# Patient Record
Sex: MALE | Race: WHITE | NOT HISPANIC OR LATINO
[De-identification: names, ages, dates, MRNs, and addresses within clinical notes are randomized per-mention and may not be internally consistent; named-entity substitution may affect disease eponyms.]

---

## 2017-01-13 ENCOUNTER — APPOINTMENT (OUTPATIENT)
Dept: RHEUMATOLOGY | Facility: CLINIC | Age: 77
End: 2017-01-13

## 2017-01-13 VITALS
HEART RATE: 74 BPM | DIASTOLIC BLOOD PRESSURE: 80 MMHG | HEIGHT: 66 IN | WEIGHT: 167 LBS | BODY MASS INDEX: 26.84 KG/M2 | SYSTOLIC BLOOD PRESSURE: 184 MMHG | OXYGEN SATURATION: 98 %

## 2017-01-13 DIAGNOSIS — Z82.49 FAMILY HISTORY OF ISCHEMIC HEART DISEASE AND OTHER DISEASES OF THE CIRCULATORY SYSTEM: ICD-10-CM

## 2017-01-13 DIAGNOSIS — Z86.39 PERSONAL HISTORY OF OTHER ENDOCRINE, NUTRITIONAL AND METABOLIC DISEASE: ICD-10-CM

## 2017-01-13 DIAGNOSIS — Z86.79 PERSONAL HISTORY OF OTHER DISEASES OF THE CIRCULATORY SYSTEM: ICD-10-CM

## 2017-02-03 ENCOUNTER — APPOINTMENT (OUTPATIENT)
Dept: RHEUMATOLOGY | Facility: CLINIC | Age: 77
End: 2017-02-03

## 2017-02-03 VITALS
WEIGHT: 168 LBS | BODY MASS INDEX: 27 KG/M2 | DIASTOLIC BLOOD PRESSURE: 82 MMHG | HEART RATE: 64 BPM | SYSTOLIC BLOOD PRESSURE: 159 MMHG | HEIGHT: 66 IN | TEMPERATURE: 97.9 F | OXYGEN SATURATION: 98 %

## 2017-02-03 LAB
ALBUMIN SERPL ELPH-MCNC: 4.6 G/DL
ALP BLD-CCNC: 71 U/L
ALT SERPL-CCNC: 25 U/L
ANION GAP SERPL CALC-SCNC: 16 MMOL/L
AST SERPL-CCNC: 23 U/L
BASOPHILS # BLD AUTO: 0.02 K/UL
BASOPHILS NFR BLD AUTO: 0.2 %
BILIRUB SERPL-MCNC: 0.3 MG/DL
BUN SERPL-MCNC: 13 MG/DL
CALCIUM SERPL-MCNC: 10.5 MG/DL
CHLORIDE SERPL-SCNC: 102 MMOL/L
CO2 SERPL-SCNC: 27 MMOL/L
CREAT SERPL-MCNC: 1.07 MG/DL
CRP SERPL-MCNC: 0.4 MG/DL
EOSINOPHIL # BLD AUTO: 0.05 K/UL
EOSINOPHIL NFR BLD AUTO: 0.5 %
ERYTHROCYTE [SEDIMENTATION RATE] IN BLOOD BY WESTERGREN METHOD: 27 MM/HR
GLUCOSE SERPL-MCNC: 102 MG/DL
HCT VFR BLD CALC: 45.8 %
HGB BLD-MCNC: 14.8 G/DL
IMM GRANULOCYTES NFR BLD AUTO: 0.4 %
LYMPHOCYTES # BLD AUTO: 1.13 K/UL
LYMPHOCYTES NFR BLD AUTO: 12.2 %
MAN DIFF?: NORMAL
MCHC RBC-ENTMCNC: 29.4 PG
MCHC RBC-ENTMCNC: 32.3 GM/DL
MCV RBC AUTO: 90.9 FL
MONOCYTES # BLD AUTO: 0.83 K/UL
MONOCYTES NFR BLD AUTO: 9 %
NEUTROPHILS # BLD AUTO: 7.18 K/UL
NEUTROPHILS NFR BLD AUTO: 77.7 %
PLATELET # BLD AUTO: 373 K/UL
POTASSIUM SERPL-SCNC: 4.9 MMOL/L
PROT SERPL-MCNC: 7.5 G/DL
RBC # BLD: 5.04 M/UL
RBC # FLD: 15.2 %
SODIUM SERPL-SCNC: 145 MMOL/L
WBC # FLD AUTO: 9.25 K/UL

## 2017-03-16 ENCOUNTER — RX RENEWAL (OUTPATIENT)
Age: 77
End: 2017-03-16

## 2017-04-06 ENCOUNTER — APPOINTMENT (OUTPATIENT)
Dept: RHEUMATOLOGY | Facility: CLINIC | Age: 77
End: 2017-04-06
Payer: MEDICARE

## 2017-04-06 VITALS
DIASTOLIC BLOOD PRESSURE: 91 MMHG | HEART RATE: 66 BPM | OXYGEN SATURATION: 98 % | SYSTOLIC BLOOD PRESSURE: 153 MMHG | BODY MASS INDEX: 26.15 KG/M2 | WEIGHT: 162 LBS | RESPIRATION RATE: 16 BRPM | TEMPERATURE: 98 F

## 2017-04-06 PROCEDURE — 99214 OFFICE O/P EST MOD 30 MIN: CPT

## 2017-04-06 RX ORDER — METHOTREXATE 2.5 MG/1
TABLET ORAL
Refills: 0 | Status: DISCONTINUED | COMMUNITY
End: 2017-04-06

## 2017-04-07 LAB
ALBUMIN SERPL ELPH-MCNC: 4.5 G/DL
ALP BLD-CCNC: 88 U/L
ALT SERPL-CCNC: 28 U/L
ANION GAP SERPL CALC-SCNC: 15 MMOL/L
AST SERPL-CCNC: 27 U/L
BASOPHILS # BLD AUTO: 0.03 K/UL
BASOPHILS NFR BLD AUTO: 0.6 %
BILIRUB SERPL-MCNC: 0.3 MG/DL
BUN SERPL-MCNC: 15 MG/DL
CALCIUM SERPL-MCNC: 10.2 MG/DL
CCP AB SER IA-ACNC: <8 UNITS
CHLORIDE SERPL-SCNC: 103 MMOL/L
CO2 SERPL-SCNC: 26 MMOL/L
CREAT SERPL-MCNC: 1.1 MG/DL
CRP SERPL-MCNC: 1.75 MG/DL
EOSINOPHIL # BLD AUTO: 0.16 K/UL
EOSINOPHIL NFR BLD AUTO: 3.1 %
ERYTHROCYTE [SEDIMENTATION RATE] IN BLOOD BY WESTERGREN METHOD: 32 MM/HR
GLUCOSE SERPL-MCNC: 100 MG/DL
HAV IGM SER QL: NONREACTIVE
HBV CORE IGM SER QL: NONREACTIVE
HBV SURFACE AG SER QL: NONREACTIVE
HCT VFR BLD CALC: 42.5 %
HCV AB SER QL: NONREACTIVE
HCV S/CO RATIO: 0.12 S/CO
HGB BLD-MCNC: 13.8 G/DL
IMM GRANULOCYTES NFR BLD AUTO: 0.2 %
LYMPHOCYTES # BLD AUTO: 1.04 K/UL
LYMPHOCYTES NFR BLD AUTO: 20.1 %
MAN DIFF?: NORMAL
MCHC RBC-ENTMCNC: 29 PG
MCHC RBC-ENTMCNC: 32.5 GM/DL
MCV RBC AUTO: 89.3 FL
MONOCYTES # BLD AUTO: 0.67 K/UL
MONOCYTES NFR BLD AUTO: 13 %
NEUTROPHILS # BLD AUTO: 3.26 K/UL
NEUTROPHILS NFR BLD AUTO: 63 %
PLATELET # BLD AUTO: 361 K/UL
POTASSIUM SERPL-SCNC: 4.3 MMOL/L
PROT SERPL-MCNC: 7.2 G/DL
RBC # BLD: 4.76 M/UL
RBC # FLD: 15.5 %
RF+CCP IGG SER-IMP: NEGATIVE
RHEUMATOID FACT SER QL: <7 IU/ML
SODIUM SERPL-SCNC: 144 MMOL/L
TSH SERPL-ACNC: 0.75 UIU/ML
WBC # FLD AUTO: 5.17 K/UL

## 2017-04-12 LAB
ADJUSTED MITOGEN: >10 IU/ML
ADJUSTED TB AG: 0 IU/ML
M TB IFN-G BLD-IMP: NEGATIVE
QUANTIFERON GOLD NIL: 0.03 IU/ML

## 2017-05-08 ENCOUNTER — RX RENEWAL (OUTPATIENT)
Age: 77
End: 2017-05-08

## 2017-05-18 ENCOUNTER — APPOINTMENT (OUTPATIENT)
Dept: RHEUMATOLOGY | Facility: CLINIC | Age: 77
End: 2017-05-18

## 2017-05-18 VITALS
HEIGHT: 66 IN | DIASTOLIC BLOOD PRESSURE: 88 MMHG | OXYGEN SATURATION: 97 % | HEART RATE: 68 BPM | WEIGHT: 168 LBS | SYSTOLIC BLOOD PRESSURE: 161 MMHG | BODY MASS INDEX: 27 KG/M2

## 2017-05-19 RX ORDER — ADALIMUMAB 40MG/0.8ML
40 KIT SUBCUTANEOUS
Qty: 1 | Refills: 5 | Status: DISCONTINUED | COMMUNITY
Start: 2017-04-06 | End: 2017-05-19

## 2017-05-23 ENCOUNTER — RX RENEWAL (OUTPATIENT)
Age: 77
End: 2017-05-23

## 2017-06-20 ENCOUNTER — RX RENEWAL (OUTPATIENT)
Age: 77
End: 2017-06-20

## 2017-06-21 ENCOUNTER — RX RENEWAL (OUTPATIENT)
Age: 77
End: 2017-06-21

## 2017-07-02 ENCOUNTER — RX RENEWAL (OUTPATIENT)
Age: 77
End: 2017-07-02

## 2017-07-13 ENCOUNTER — APPOINTMENT (OUTPATIENT)
Dept: RHEUMATOLOGY | Facility: CLINIC | Age: 77
End: 2017-07-13

## 2017-07-13 VITALS
TEMPERATURE: 98.1 F | HEART RATE: 70 BPM | SYSTOLIC BLOOD PRESSURE: 141 MMHG | WEIGHT: 163 LBS | OXYGEN SATURATION: 98 % | HEIGHT: 66 IN | BODY MASS INDEX: 26.2 KG/M2 | DIASTOLIC BLOOD PRESSURE: 88 MMHG

## 2017-07-13 LAB
ALBUMIN SERPL ELPH-MCNC: 4.7 G/DL
ALP BLD-CCNC: 83 U/L
ALT SERPL-CCNC: 35 U/L
ANION GAP SERPL CALC-SCNC: 17 MMOL/L
AST SERPL-CCNC: 25 U/L
BASOPHILS # BLD AUTO: 0.04 K/UL
BASOPHILS NFR BLD AUTO: 0.7 %
BILIRUB SERPL-MCNC: 0.3 MG/DL
BUN SERPL-MCNC: 18 MG/DL
CALCIUM SERPL-MCNC: 10 MG/DL
CHLORIDE SERPL-SCNC: 101 MMOL/L
CO2 SERPL-SCNC: 24 MMOL/L
CREAT SERPL-MCNC: 1.26 MG/DL
CRP SERPL-MCNC: 0.4 MG/DL
EOSINOPHIL # BLD AUTO: 0.24 K/UL
EOSINOPHIL NFR BLD AUTO: 4.2 %
ERYTHROCYTE [SEDIMENTATION RATE] IN BLOOD BY WESTERGREN METHOD: 27 MM/HR
GLUCOSE SERPL-MCNC: 82 MG/DL
HCT VFR BLD CALC: 43 %
HGB BLD-MCNC: 13.9 G/DL
IMM GRANULOCYTES NFR BLD AUTO: 0.4 %
LYMPHOCYTES # BLD AUTO: 1.24 K/UL
LYMPHOCYTES NFR BLD AUTO: 21.9 %
MAN DIFF?: NORMAL
MCHC RBC-ENTMCNC: 29 PG
MCHC RBC-ENTMCNC: 32.3 GM/DL
MCV RBC AUTO: 89.6 FL
MONOCYTES # BLD AUTO: 0.87 K/UL
MONOCYTES NFR BLD AUTO: 15.4 %
NEUTROPHILS # BLD AUTO: 3.25 K/UL
NEUTROPHILS NFR BLD AUTO: 57.4 %
PLATELET # BLD AUTO: 276 K/UL
POTASSIUM SERPL-SCNC: 4.7 MMOL/L
PROT SERPL-MCNC: 7.4 G/DL
RBC # BLD: 4.8 M/UL
RBC # FLD: 14.6 %
SODIUM SERPL-SCNC: 142 MMOL/L
WBC # FLD AUTO: 5.66 K/UL

## 2017-07-13 RX ORDER — PREDNISONE 5 MG/1
5 TABLET ORAL
Qty: 50 | Refills: 1 | Status: DISCONTINUED | COMMUNITY
Start: 2017-01-13 | End: 2017-07-13

## 2017-07-13 RX ORDER — GOLIMUMAB 50 MG/4ML
50 SOLUTION INTRAVENOUS
Qty: 4 | Refills: 0 | Status: COMPLETED | OUTPATIENT
Start: 2017-05-19 | End: 2017-07-13

## 2017-07-13 RX ORDER — PREDNISONE 10 MG
TABLET ORAL
Refills: 0 | Status: COMPLETED | COMMUNITY
End: 2017-07-13

## 2017-08-24 ENCOUNTER — APPOINTMENT (OUTPATIENT)
Dept: RHEUMATOLOGY | Facility: CLINIC | Age: 77
End: 2017-08-24

## 2017-08-30 RX ORDER — SULFASALAZINE 500 MG/1
500 TABLET, DELAYED RELEASE ORAL
Qty: 60 | Refills: 1 | Status: COMPLETED | COMMUNITY
Start: 2017-05-23 | End: 2017-08-30

## 2017-08-30 RX ORDER — HYDROXYCHLOROQUINE SULFATE 200 MG/1
200 TABLET, FILM COATED ORAL TWICE DAILY
Qty: 60 | Refills: 0 | Status: COMPLETED | COMMUNITY
Start: 2017-05-23 | End: 2017-08-30

## 2017-09-14 ENCOUNTER — RX RENEWAL (OUTPATIENT)
Age: 77
End: 2017-09-14

## 2017-09-15 ENCOUNTER — APPOINTMENT (OUTPATIENT)
Dept: RHEUMATOLOGY | Facility: CLINIC | Age: 77
End: 2017-09-15

## 2017-10-06 ENCOUNTER — APPOINTMENT (OUTPATIENT)
Dept: RHEUMATOLOGY | Facility: CLINIC | Age: 77
End: 2017-10-06
Payer: MEDICARE

## 2017-10-06 VITALS
DIASTOLIC BLOOD PRESSURE: 72 MMHG | SYSTOLIC BLOOD PRESSURE: 149 MMHG | HEIGHT: 66 IN | TEMPERATURE: 98 F | BODY MASS INDEX: 25.55 KG/M2 | WEIGHT: 159 LBS | OXYGEN SATURATION: 96 % | HEART RATE: 60 BPM

## 2017-10-06 DIAGNOSIS — Z87.19 PERSONAL HISTORY OF OTHER DISEASES OF THE DIGESTIVE SYSTEM: ICD-10-CM

## 2017-10-06 PROCEDURE — 99214 OFFICE O/P EST MOD 30 MIN: CPT

## 2017-11-14 ENCOUNTER — RX RENEWAL (OUTPATIENT)
Age: 77
End: 2017-11-14

## 2017-12-08 ENCOUNTER — APPOINTMENT (OUTPATIENT)
Dept: RHEUMATOLOGY | Facility: CLINIC | Age: 77
End: 2017-12-08
Payer: MEDICARE

## 2017-12-08 VITALS
OXYGEN SATURATION: 97 % | SYSTOLIC BLOOD PRESSURE: 150 MMHG | DIASTOLIC BLOOD PRESSURE: 82 MMHG | TEMPERATURE: 98.2 F | HEART RATE: 70 BPM | HEIGHT: 66 IN | RESPIRATION RATE: 16 BRPM | BODY MASS INDEX: 25.55 KG/M2 | WEIGHT: 159 LBS

## 2017-12-08 PROCEDURE — 99214 OFFICE O/P EST MOD 30 MIN: CPT

## 2017-12-21 ENCOUNTER — RX RENEWAL (OUTPATIENT)
Age: 77
End: 2017-12-21

## 2017-12-21 RX ORDER — TOFACITINIB 5 MG/1
5 TABLET, FILM COATED ORAL
Qty: 60 | Refills: 1 | Status: DISCONTINUED | COMMUNITY
Start: 2017-05-08 | End: 2017-12-21

## 2018-01-30 ENCOUNTER — APPOINTMENT (OUTPATIENT)
Dept: GASTROENTEROLOGY | Facility: CLINIC | Age: 78
End: 2018-01-30

## 2018-03-09 ENCOUNTER — APPOINTMENT (OUTPATIENT)
Dept: RHEUMATOLOGY | Facility: CLINIC | Age: 78
End: 2018-03-09

## 2018-04-12 ENCOUNTER — APPOINTMENT (OUTPATIENT)
Dept: GASTROENTEROLOGY | Facility: CLINIC | Age: 78
End: 2018-04-12
Payer: MEDICARE

## 2018-04-12 VITALS
TEMPERATURE: 97.8 F | DIASTOLIC BLOOD PRESSURE: 80 MMHG | WEIGHT: 169 LBS | HEIGHT: 70 IN | SYSTOLIC BLOOD PRESSURE: 120 MMHG | BODY MASS INDEX: 24.2 KG/M2

## 2018-04-12 DIAGNOSIS — Z82.49 FAMILY HISTORY OF ISCHEMIC HEART DISEASE AND OTHER DISEASES OF THE CIRCULATORY SYSTEM: ICD-10-CM

## 2018-04-12 DIAGNOSIS — Z78.9 OTHER SPECIFIED HEALTH STATUS: ICD-10-CM

## 2018-04-12 DIAGNOSIS — M06.00 RHEUMATOID ARTHRITIS W/OUT RHEUMATOID FACTOR, UNSPECIFIED SITE: ICD-10-CM

## 2018-04-12 DIAGNOSIS — Z83.1 FAMILY HISTORY OF OTHER INFECTIOUS AND PARASITIC DISEASES: ICD-10-CM

## 2018-04-12 DIAGNOSIS — K63.5 POLYP OF COLON: ICD-10-CM

## 2018-04-12 PROCEDURE — 99204 OFFICE O/P NEW MOD 45 MIN: CPT

## 2018-04-12 RX ORDER — METHOTREXATE 2.5 MG/1
2.5 TABLET ORAL
Qty: 32 | Refills: 2 | Status: DISCONTINUED | COMMUNITY
Start: 2017-02-03 | End: 2018-04-12

## 2018-04-12 RX ORDER — LACTOBACILLUS ACIDOPHILUS/PECT 30 MG-20MG
TABLET ORAL
Refills: 0 | Status: DISCONTINUED | COMMUNITY
End: 2018-04-12

## 2018-04-12 RX ORDER — TOFACITINIB 11 MG/1
11 TABLET, FILM COATED, EXTENDED RELEASE ORAL
Qty: 90 | Refills: 1 | Status: DISCONTINUED | COMMUNITY
Start: 2017-12-08 | End: 2018-04-12

## 2018-04-12 RX ORDER — CHROMIUM 200 MCG
TABLET ORAL
Refills: 0 | Status: DISCONTINUED | COMMUNITY
End: 2018-04-12

## 2018-04-12 RX ORDER — OMEPRAZOLE 20 MG/1
20 CAPSULE, DELAYED RELEASE ORAL
Qty: 30 | Refills: 5 | Status: DISCONTINUED | COMMUNITY
Start: 2017-01-13 | End: 2018-04-12

## 2018-04-12 RX ORDER — LEVOTHYROXINE SODIUM 175 MCG
175 TABLET ORAL
Refills: 0 | Status: DISCONTINUED | COMMUNITY
End: 2018-04-12

## 2018-05-07 ENCOUNTER — APPOINTMENT (OUTPATIENT)
Dept: GASTROENTEROLOGY | Facility: CLINIC | Age: 78
End: 2018-05-07
Payer: MEDICARE

## 2018-05-07 VITALS
SYSTOLIC BLOOD PRESSURE: 130 MMHG | BODY MASS INDEX: 23.34 KG/M2 | HEIGHT: 70 IN | TEMPERATURE: 97.5 F | WEIGHT: 163 LBS | DIASTOLIC BLOOD PRESSURE: 80 MMHG

## 2018-05-07 DIAGNOSIS — Z09 ENCOUNTER FOR FOLLOW-UP EXAMINATION AFTER COMPLETED TREATMENT FOR CONDITIONS OTHER THAN MALIGNANT NEOPLASM: ICD-10-CM

## 2018-05-07 DIAGNOSIS — R74.8 ABNORMAL LEVELS OF OTHER SERUM ENZYMES: ICD-10-CM

## 2018-05-07 DIAGNOSIS — K86.89 OTHER SPECIFIED DISEASES OF PANCREAS: ICD-10-CM

## 2018-05-07 PROCEDURE — 99213 OFFICE O/P EST LOW 20 MIN: CPT

## 2018-06-28 ENCOUNTER — APPOINTMENT (OUTPATIENT)
Dept: VASCULAR SURGERY | Facility: CLINIC | Age: 78
End: 2018-06-28
Payer: MEDICARE

## 2018-06-28 PROCEDURE — 99203 OFFICE O/P NEW LOW 30 MIN: CPT

## 2018-07-03 VITALS — SYSTOLIC BLOOD PRESSURE: 125 MMHG | DIASTOLIC BLOOD PRESSURE: 70 MMHG

## 2018-07-03 RX ORDER — CHOLECALCIFEROL (VITAMIN D3) 1250 MCG
1.25 MG CAPSULE ORAL
Qty: 4 | Refills: 0 | Status: COMPLETED | COMMUNITY
Start: 2017-01-13 | End: 2018-07-03

## 2018-07-03 RX ORDER — OMEPRAZOLE MAGNESIUM 20 MG/1
20 TABLET, DELAYED RELEASE ORAL DAILY
Qty: 30 | Refills: 0 | Status: COMPLETED | OUTPATIENT
Start: 2018-04-12 | End: 2018-07-03

## 2019-09-02 PROBLEM — Z09 FOLLOW UP: Status: ACTIVE | Noted: 2018-05-07

## 2024-02-14 ENCOUNTER — NON-APPOINTMENT (OUTPATIENT)
Age: 84
End: 2024-02-14

## 2024-02-14 ENCOUNTER — LABORATORY RESULT (OUTPATIENT)
Age: 84
End: 2024-02-14

## 2024-02-14 ENCOUNTER — APPOINTMENT (OUTPATIENT)
Dept: FAMILY MEDICINE | Facility: CLINIC | Age: 84
End: 2024-02-14
Payer: MEDICARE

## 2024-02-14 VITALS
DIASTOLIC BLOOD PRESSURE: 84 MMHG | TEMPERATURE: 96.5 F | HEART RATE: 74 BPM | BODY MASS INDEX: 23.48 KG/M2 | RESPIRATION RATE: 16 BRPM | WEIGHT: 164 LBS | SYSTOLIC BLOOD PRESSURE: 139 MMHG | HEIGHT: 70 IN | OXYGEN SATURATION: 94 %

## 2024-02-14 DIAGNOSIS — E03.9 HYPOTHYROIDISM, UNSPECIFIED: ICD-10-CM

## 2024-02-14 DIAGNOSIS — M79.89 OTHER SPECIFIED SOFT TISSUE DISORDERS: ICD-10-CM

## 2024-02-14 DIAGNOSIS — H91.90 UNSPECIFIED HEARING LOSS, UNSPECIFIED EAR: ICD-10-CM

## 2024-02-14 DIAGNOSIS — Z86.61 PERSONAL HISTORY OF INFECTIONS OF THE CENTRAL NERVOUS SYSTEM: ICD-10-CM

## 2024-02-14 DIAGNOSIS — M06.9 RHEUMATOID ARTHRITIS, UNSPECIFIED: ICD-10-CM

## 2024-02-14 DIAGNOSIS — Z84.0 FAMILY HISTORY OF DISEASES OF THE SKIN AND SUBCUTANEOUS TISSUE: ICD-10-CM

## 2024-02-14 DIAGNOSIS — Z86.79 PERSONAL HISTORY OF OTHER DISEASES OF THE CIRCULATORY SYSTEM: ICD-10-CM

## 2024-02-14 DIAGNOSIS — Z80.0 FAMILY HISTORY OF MALIGNANT NEOPLASM OF DIGESTIVE ORGANS: ICD-10-CM

## 2024-02-14 DIAGNOSIS — Z82.0 FAMILY HISTORY OF EPILEPSY AND OTHER DISEASES OF THE NERVOUS SYSTEM: ICD-10-CM

## 2024-02-14 DIAGNOSIS — I73.9 PERIPHERAL VASCULAR DISEASE, UNSPECIFIED: ICD-10-CM

## 2024-02-14 DIAGNOSIS — Z87.898 PERSONAL HISTORY OF OTHER SPECIFIED CONDITIONS: ICD-10-CM

## 2024-02-14 DIAGNOSIS — K57.30 DIVERTICULOSIS OF LARGE INTESTINE W/OUT PERFORATION OR ABSCESS W/OUT BLEEDING: ICD-10-CM

## 2024-02-14 DIAGNOSIS — Z23 ENCOUNTER FOR IMMUNIZATION: ICD-10-CM

## 2024-02-14 DIAGNOSIS — Z00.00 ENCOUNTER FOR GENERAL ADULT MEDICAL EXAMINATION W/OUT ABNORMAL FINDINGS: ICD-10-CM

## 2024-02-14 DIAGNOSIS — M51.26 OTHER INTERVERTEBRAL DISC DISPLACEMENT, LUMBAR REGION: ICD-10-CM

## 2024-02-14 DIAGNOSIS — J06.9 ACUTE UPPER RESPIRATORY INFECTION, UNSPECIFIED: ICD-10-CM

## 2024-02-14 DIAGNOSIS — K29.70 GASTRITIS, UNSPECIFIED, W/OUT BLEEDING: ICD-10-CM

## 2024-02-14 DIAGNOSIS — Z79.899 OTHER LONG TERM (CURRENT) DRUG THERAPY: ICD-10-CM

## 2024-02-14 DIAGNOSIS — Z87.19 PERSONAL HISTORY OF OTHER DISEASES OF THE DIGESTIVE SYSTEM: ICD-10-CM

## 2024-02-14 DIAGNOSIS — R10.9 UNSPECIFIED ABDOMINAL PAIN: ICD-10-CM

## 2024-02-14 DIAGNOSIS — R35.0 FREQUENCY OF MICTURITION: ICD-10-CM

## 2024-02-14 DIAGNOSIS — R94.31 ABNORMAL ELECTROCARDIOGRAM [ECG] [EKG]: ICD-10-CM

## 2024-02-14 DIAGNOSIS — L30.9 DERMATITIS, UNSPECIFIED: ICD-10-CM

## 2024-02-14 PROCEDURE — G0439: CPT

## 2024-02-14 PROCEDURE — 93000 ELECTROCARDIOGRAM COMPLETE: CPT

## 2024-02-14 PROCEDURE — 36415 COLL VENOUS BLD VENIPUNCTURE: CPT

## 2024-02-14 RX ORDER — FOLIC ACID 1 MG/1
1 TABLET ORAL
Refills: 0 | Status: DISCONTINUED | COMMUNITY
End: 2024-02-14

## 2024-02-14 RX ORDER — VITAMIN E ACID SUCCINATE 268 MG
TABLET ORAL
Refills: 0 | Status: ACTIVE | COMMUNITY

## 2024-02-14 RX ORDER — ASPIRIN 81 MG
81 TABLET, DELAYED RELEASE (ENTERIC COATED) ORAL
Refills: 0 | Status: DISCONTINUED | COMMUNITY
End: 2024-02-14

## 2024-02-14 RX ORDER — MULTIVIT-MIN/FOLIC/VIT K/LYCOP 400-300MCG
25 MCG TABLET ORAL
Refills: 0 | Status: DISCONTINUED | COMMUNITY
End: 2024-02-14

## 2024-02-14 RX ORDER — PSYLLIUM HUSK 0.4 G
CAPSULE ORAL
Refills: 0 | Status: ACTIVE | COMMUNITY

## 2024-02-14 RX ORDER — ASCORBIC ACID 500 MG
TABLET ORAL
Refills: 0 | Status: ACTIVE | COMMUNITY

## 2024-02-14 RX ORDER — NAPROXEN SODIUM 220 MG
220 TABLET ORAL
Refills: 0 | Status: DISCONTINUED | COMMUNITY
End: 2024-02-14

## 2024-02-14 NOTE — PHYSICAL EXAM
[No Acute Distress] : no acute distress [Well Nourished] : well nourished [Well Developed] : well developed [Well-Appearing] : well-appearing [Normal Sclera/Conjunctiva] : normal sclera/conjunctiva [PERRL] : pupils equal round and reactive to light [EOMI] : extraocular movements intact [Normal Outer Ear/Nose] : the outer ears and nose were normal in appearance [Normal Oropharynx] : the oropharynx was normal [No JVD] : no jugular venous distention [No Lymphadenopathy] : no lymphadenopathy [Supple] : supple [Thyroid Normal, No Nodules] : the thyroid was normal and there were no nodules present [No Respiratory Distress] : no respiratory distress  [No Accessory Muscle Use] : no accessory muscle use [Clear to Auscultation] : lungs were clear to auscultation bilaterally [Normal Rate] : normal rate  [Regular Rhythm] : with a regular rhythm [Normal S1, S2] : normal S1 and S2 [No Murmur] : no murmur heard [No Abdominal Bruit] : a ~M bruit was not heard ~T in the abdomen [No Varicosities] : no varicosities [Pedal Pulses Present] : the pedal pulses are present [No Edema] : there was no peripheral edema [No Palpable Aorta] : no palpable aorta [No Extremity Clubbing/Cyanosis] : no extremity clubbing/cyanosis [Soft] : abdomen soft [Non Tender] : non-tender [Non-distended] : non-distended [No Masses] : no abdominal mass palpated [No HSM] : no HSM [Normal Bowel Sounds] : normal bowel sounds [Normal Posterior Cervical Nodes] : no posterior cervical lymphadenopathy [Normal Anterior Cervical Nodes] : no anterior cervical lymphadenopathy [No CVA Tenderness] : no CVA  tenderness [No Spinal Tenderness] : no spinal tenderness [No Joint Swelling] : no joint swelling [Grossly Normal Strength/Tone] : grossly normal strength/tone [No Rash] : no rash [Coordination Grossly Intact] : coordination grossly intact [No Focal Deficits] : no focal deficits [Normal Gait] : normal gait [Normal Affect] : the affect was normal [Normal Insight/Judgement] : insight and judgment were intact [Normal TMs] : both tympanic membranes were normal [Normal Supraclavicular Nodes] : no supraclavicular lymphadenopathy [Alert and Oriented x3] : oriented to person, place, and time [de-identified] : elderly male [de-identified] : dentures [de-identified] : diminished lung sounds; productive cough

## 2024-02-14 NOTE — HISTORY OF PRESENT ILLNESS
[Spouse] : spouse [FreeTextEntry1] : 83 year old male who presents today for a complete physical exam and seeking new PCP wife of current pt Dian Palafox. c/o persistent cough for a 1 1/2 wk c/o chronic abdominal pain relieved after BM states had recent nml colonoscopy in nov 2023 c/o urinary frequency [de-identified] : flu vx 10/23 pneumonia vx 10/23 tdap 11 yrs ago

## 2024-02-14 NOTE — HEALTH RISK ASSESSMENT
[Good] : ~his/her~  mood as  good [Yes] : Yes [Monthly or less (1 pt)] : Monthly or less (1 point) [1 or 2 (0 pts)] : 1 or 2 (0 points) [Never (0 pts)] : Never (0 points) [No] : In the past 12 months have you used drugs other than those required for medical reasons? No [Former] : Former [20 or more] : 20 or more [de-identified] : walks everywhere [de-identified] : quit 20 yrs ago; 1 ppd x 50 yrs

## 2024-02-15 LAB
25(OH)D3 SERPL-MCNC: 50.2 NG/ML
APPEARANCE: CLEAR
BASOPHILS # BLD AUTO: 0.04 K/UL
BASOPHILS NFR BLD AUTO: 0.5 %
BILIRUBIN URINE: NEGATIVE
BLOOD URINE: ABNORMAL
CHOLEST SERPL-MCNC: 170 MG/DL
COLOR: YELLOW
EOSINOPHIL # BLD AUTO: 0.26 K/UL
EOSINOPHIL NFR BLD AUTO: 3.4 %
ESTIMATED AVERAGE GLUCOSE: 117 MG/DL
GLUCOSE QUALITATIVE U: NEGATIVE MG/DL
HBA1C MFR BLD HPLC: 5.7 %
HCT VFR BLD CALC: 50.8 %
HDLC SERPL-MCNC: 46 MG/DL
HGB BLD-MCNC: 16.3 G/DL
IMM GRANULOCYTES NFR BLD AUTO: 0.4 %
KETONES URINE: NEGATIVE MG/DL
LDLC SERPL CALC-MCNC: 101 MG/DL
LEUKOCYTE ESTERASE URINE: ABNORMAL
LYMPHOCYTES # BLD AUTO: 1.44 K/UL
LYMPHOCYTES NFR BLD AUTO: 18.8 %
MAN DIFF?: NORMAL
MCHC RBC-ENTMCNC: 28.8 PG
MCHC RBC-ENTMCNC: 32.1 GM/DL
MCV RBC AUTO: 89.9 FL
MONOCYTES # BLD AUTO: 0.93 K/UL
MONOCYTES NFR BLD AUTO: 12.2 %
NEUTROPHILS # BLD AUTO: 4.95 K/UL
NEUTROPHILS NFR BLD AUTO: 64.7 %
NITRITE URINE: NEGATIVE
NONHDLC SERPL-MCNC: 124 MG/DL
PH URINE: 6.5
PLATELET # BLD AUTO: 370 K/UL
PROTEIN URINE: NORMAL MG/DL
PSA SERPL-MCNC: 3.16 NG/ML
RBC # BLD: 5.65 M/UL
RBC # FLD: 14.1 %
SPECIFIC GRAVITY URINE: 1.02
T3FREE SERPL-MCNC: 2.72 PG/ML
T4 FREE SERPL-MCNC: 1.8 NG/DL
TESTOST SERPL-MCNC: 435 NG/DL
TRIGL SERPL-MCNC: 128 MG/DL
TSH SERPL-ACNC: 0.94 UIU/ML
UROBILINOGEN URINE: 0.2 MG/DL
WBC # FLD AUTO: 7.65 K/UL

## 2024-02-16 PROBLEM — M51.26 LUMBAR HERNIATED DISC: Status: ACTIVE | Noted: 2024-02-16

## 2024-02-16 PROBLEM — Z86.79 HISTORY OF INTERMITTENT CLAUDICATION: Status: RESOLVED | Noted: 2024-02-16 | Resolved: 2024-02-16

## 2024-02-16 PROBLEM — Z86.79 HISTORY OF CORONARY ARTERY DISEASE: Status: RESOLVED | Noted: 2024-02-16 | Resolved: 2024-02-16

## 2024-02-16 PROBLEM — I73.9 PERIPHERAL VASCULAR DISEASE: Status: ACTIVE | Noted: 2024-02-16

## 2024-02-16 PROBLEM — M06.9 RHEUMATOID ARTHRITIS: Status: ACTIVE | Noted: 2024-02-16

## 2024-02-16 PROBLEM — H91.90 HARD OF HEARING: Status: ACTIVE | Noted: 2024-02-14

## 2024-02-16 PROBLEM — K57.30 DIVERTICULOSIS OF COLON: Status: RESOLVED | Noted: 2024-02-16 | Resolved: 2024-02-16

## 2024-02-16 PROBLEM — Z86.61 HISTORY OF ENCEPHALITIS: Status: RESOLVED | Noted: 2024-02-16 | Resolved: 2024-02-16

## 2024-02-16 PROBLEM — L30.9 ECZEMA: Status: ACTIVE | Noted: 2024-02-14

## 2024-02-16 LAB
ALBUMIN SERPL ELPH-MCNC: 5.2 G/DL
ALP BLD-CCNC: 134 U/L
ALT SERPL-CCNC: 31 U/L
ANION GAP SERPL CALC-SCNC: 14 MMOL/L
AST SERPL-CCNC: 29 U/L
BILIRUB SERPL-MCNC: 0.6 MG/DL
BUN SERPL-MCNC: 16 MG/DL
CALCIUM SERPL-MCNC: 10.3 MG/DL
CHLORIDE SERPL-SCNC: 101 MMOL/L
CO2 SERPL-SCNC: 29 MMOL/L
CREAT SERPL-MCNC: 1.2 MG/DL
EGFR: 60 ML/MIN/1.73M2
GLUCOSE SERPL-MCNC: 100 MG/DL
POTASSIUM SERPL-SCNC: 4.6 MMOL/L
PROT SERPL-MCNC: 7.8 G/DL
SODIUM SERPL-SCNC: 144 MMOL/L

## 2024-02-21 DIAGNOSIS — J20.9 ACUTE BRONCHITIS, UNSPECIFIED: ICD-10-CM

## 2024-02-28 RX ORDER — LEVOTHYROXINE SODIUM 0.1 MG/1
100 TABLET ORAL
Qty: 90 | Refills: 1 | Status: ACTIVE | COMMUNITY
Start: 2018-04-03 | End: 1900-01-01

## 2024-02-28 RX ORDER — AMLODIPINE BESYLATE 10 MG/1
10 TABLET ORAL
Qty: 90 | Refills: 1 | Status: ACTIVE | COMMUNITY
Start: 1900-01-01 | End: 1900-01-01

## 2024-03-27 ENCOUNTER — OUTPATIENT (OUTPATIENT)
Dept: OUTPATIENT SERVICES | Facility: HOSPITAL | Age: 84
LOS: 1 days | End: 2024-03-27
Payer: MEDICARE

## 2024-03-27 ENCOUNTER — APPOINTMENT (OUTPATIENT)
Dept: RADIOLOGY | Facility: CLINIC | Age: 84
End: 2024-03-27
Payer: MEDICARE

## 2024-03-27 ENCOUNTER — APPOINTMENT (OUTPATIENT)
Dept: FAMILY MEDICINE | Facility: CLINIC | Age: 84
End: 2024-03-27
Payer: MEDICARE

## 2024-03-27 VITALS
OXYGEN SATURATION: 95 % | SYSTOLIC BLOOD PRESSURE: 130 MMHG | HEART RATE: 104 BPM | TEMPERATURE: 97.5 F | HEIGHT: 70 IN | RESPIRATION RATE: 16 BRPM | BODY MASS INDEX: 23.19 KG/M2 | WEIGHT: 162 LBS | DIASTOLIC BLOOD PRESSURE: 70 MMHG

## 2024-03-27 DIAGNOSIS — R06.02 SHORTNESS OF BREATH: ICD-10-CM

## 2024-03-27 DIAGNOSIS — R05.3 CHRONIC COUGH: ICD-10-CM

## 2024-03-27 DIAGNOSIS — Z87.891 PERSONAL HISTORY OF NICOTINE DEPENDENCE: ICD-10-CM

## 2024-03-27 DIAGNOSIS — J18.9 PNEUMONIA, UNSPECIFIED ORGANISM: ICD-10-CM

## 2024-03-27 PROCEDURE — 71046 X-RAY EXAM CHEST 2 VIEWS: CPT

## 2024-03-27 PROCEDURE — 71046 X-RAY EXAM CHEST 2 VIEWS: CPT | Mod: 26

## 2024-03-27 PROCEDURE — 99214 OFFICE O/P EST MOD 30 MIN: CPT

## 2024-03-27 NOTE — HISTORY OF PRESENT ILLNESS
[FreeTextEntry1] : c/o persistent cough and phlegm; former smoker; went to urgent care completed augmentin, prednisone, albuterol, still on mucinex and promethazine but feels he's not getting better

## 2024-03-27 NOTE — PHYSICAL EXAM
[No Acute Distress] : no acute distress [Normal Sclera/Conjunctiva] : normal sclera/conjunctiva [EOMI] : extraocular movements intact [Normal Outer Ear/Nose] : the outer ears and nose were normal in appearance [No JVD] : no jugular venous distention [Coordination Grossly Intact] : coordination grossly intact [Normal Rate] : normal rate  [Regular Rhythm] : with a regular rhythm [Alert and Oriented x3] : oriented to person, place, and time [Normal Affect] : the affect was normal [de-identified] : diminished to b/l lobes, occasional wheeze [Normal Insight/Judgement] : insight and judgment were intact

## 2024-03-29 RX ORDER — ALBUTEROL SULFATE 90 UG/1
108 (90 BASE) INHALANT RESPIRATORY (INHALATION)
Qty: 1 | Refills: 1 | Status: ACTIVE | COMMUNITY
Start: 2024-03-29 | End: 1900-01-01

## 2024-04-10 ENCOUNTER — APPOINTMENT (OUTPATIENT)
Dept: FAMILY MEDICINE | Facility: CLINIC | Age: 84
End: 2024-04-10
Payer: MEDICARE

## 2024-04-10 VITALS
TEMPERATURE: 97.5 F | DIASTOLIC BLOOD PRESSURE: 74 MMHG | SYSTOLIC BLOOD PRESSURE: 138 MMHG | WEIGHT: 162 LBS | OXYGEN SATURATION: 95 % | BODY MASS INDEX: 23.19 KG/M2 | RESPIRATION RATE: 16 BRPM | HEART RATE: 82 BPM | HEIGHT: 70 IN

## 2024-04-10 DIAGNOSIS — R09.89 OTHER SPECIFIED SYMPTOMS AND SIGNS INVOLVING THE CIRCULATORY AND RESPIRATORY SYSTEMS: ICD-10-CM

## 2024-04-10 DIAGNOSIS — R05.3 CHRONIC COUGH: ICD-10-CM

## 2024-04-10 DIAGNOSIS — J06.9 ACUTE UPPER RESPIRATORY INFECTION, UNSPECIFIED: ICD-10-CM

## 2024-04-10 PROCEDURE — 99213 OFFICE O/P EST LOW 20 MIN: CPT

## 2024-04-10 RX ORDER — DOXYCYCLINE 100 MG/1
100 CAPSULE ORAL TWICE DAILY
Qty: 20 | Refills: 0 | Status: DISCONTINUED | COMMUNITY
Start: 2024-03-27 | End: 2024-04-10

## 2024-04-10 RX ORDER — PROMETHAZINE HYDROCHLORIDE AND DEXTROMETHORPHAN HYDROBROMIDE ORAL SOLUTION 15; 6.25 MG/5ML; MG/5ML
6.25-15 SOLUTION ORAL
Qty: 1 | Refills: 1 | Status: DISCONTINUED | COMMUNITY
Start: 2024-02-21 | End: 2024-04-10

## 2024-04-10 RX ORDER — AZITHROMYCIN 250 MG/1
250 TABLET, FILM COATED ORAL
Qty: 1 | Refills: 0 | Status: DISCONTINUED | COMMUNITY
Start: 2024-02-14 | End: 2024-04-10

## 2024-04-10 RX ORDER — BENZONATATE 200 MG/1
200 CAPSULE ORAL
Qty: 30 | Refills: 0 | Status: DISCONTINUED | COMMUNITY
Start: 2024-03-29 | End: 2024-04-10

## 2024-04-10 NOTE — HISTORY OF PRESENT ILLNESS
[FreeTextEntry1] : here for follow up; feeling a lot better from recent URI, completed meds planning to travel to [place] in June for at least a month

## 2024-05-30 ENCOUNTER — APPOINTMENT (OUTPATIENT)
Dept: CARDIOLOGY | Facility: CLINIC | Age: 84
End: 2024-05-30
Payer: MEDICARE

## 2024-05-30 VITALS — SYSTOLIC BLOOD PRESSURE: 140 MMHG | DIASTOLIC BLOOD PRESSURE: 62 MMHG

## 2024-05-30 VITALS
OXYGEN SATURATION: 95 % | WEIGHT: 156 LBS | DIASTOLIC BLOOD PRESSURE: 71 MMHG | SYSTOLIC BLOOD PRESSURE: 147 MMHG | BODY MASS INDEX: 22.38 KG/M2 | HEART RATE: 65 BPM

## 2024-05-30 DIAGNOSIS — I10 ESSENTIAL (PRIMARY) HYPERTENSION: ICD-10-CM

## 2024-05-30 DIAGNOSIS — R06.02 SHORTNESS OF BREATH: ICD-10-CM

## 2024-05-30 DIAGNOSIS — E78.49 OTHER HYPERLIPIDEMIA: ICD-10-CM

## 2024-05-30 DIAGNOSIS — I45.10 UNSPECIFIED RIGHT BUNDLE-BRANCH BLOCK: ICD-10-CM

## 2024-05-30 PROCEDURE — G2211 COMPLEX E/M VISIT ADD ON: CPT

## 2024-05-30 PROCEDURE — 99204 OFFICE O/P NEW MOD 45 MIN: CPT

## 2024-05-30 NOTE — HISTORY OF PRESENT ILLNESS
[FreeTextEntry1] : 83M presents for eval of RODAS, RBBB  Pt noted on ECG with RBBB (no prior ECG's) Also notes some dyspnea when working the lawn while on his knees, also dyspnea with walking Notes symptoms have been present for around 1 year. Denies CP, lightheadedness "Aortic aneurysm" listed on problem list on EMR, pt is unaware   Former smoker, quit 2004. [place]ired- [employer] . Accompanied by his wife  ECG: SR with RBBB  Atorvastatin 10mg Amlodipine 10mg

## 2024-05-30 NOTE — DISCUSSION/SUMMARY
[FreeTextEntry1] : RODAS: Pt notes for the last year. Known atherosclerosis in abdominal aorta in past.   TTE Pharm NST to rule out ischemia  HTN: BP borderline, improving on recheck. Continue amlodipine  HLD: , on 10mg liptior. May need more aggressive care  AAA: 3.1cm on scan from 2018. Recommend vascular eval, number provided   RV 6M

## 2024-06-05 ENCOUNTER — APPOINTMENT (OUTPATIENT)
Dept: CARDIOLOGY | Facility: CLINIC | Age: 84
End: 2024-06-05
Payer: MEDICARE

## 2024-06-05 PROCEDURE — A9500: CPT

## 2024-06-05 PROCEDURE — 93015 CV STRESS TEST SUPVJ I&R: CPT

## 2024-06-05 PROCEDURE — 78452 HT MUSCLE IMAGE SPECT MULT: CPT

## 2024-06-10 ENCOUNTER — NON-APPOINTMENT (OUTPATIENT)
Age: 84
End: 2024-06-10

## 2024-06-10 RX ORDER — ATORVASTATIN CALCIUM 10 MG/1
10 TABLET, FILM COATED ORAL
Qty: 90 | Refills: 0 | Status: ACTIVE | COMMUNITY
Start: 1900-01-01 | End: 1900-01-01

## 2024-06-24 ENCOUNTER — APPOINTMENT (OUTPATIENT)
Dept: VASCULAR SURGERY | Facility: CLINIC | Age: 84
End: 2024-06-24
Payer: MEDICARE

## 2024-06-24 VITALS
HEIGHT: 70 IN | OXYGEN SATURATION: 95 % | DIASTOLIC BLOOD PRESSURE: 78 MMHG | BODY MASS INDEX: 22.76 KG/M2 | SYSTOLIC BLOOD PRESSURE: 149 MMHG | WEIGHT: 159 LBS | HEART RATE: 78 BPM

## 2024-06-24 DIAGNOSIS — I71.9 AORTIC ANEURYSM OF UNSPECIFIED SITE, W/OUT RUPTURE: ICD-10-CM

## 2024-06-24 PROCEDURE — 99203 OFFICE O/P NEW LOW 30 MIN: CPT

## 2024-06-24 PROCEDURE — 93978 VASCULAR STUDY: CPT

## 2024-06-24 RX ORDER — OMEPRAZOLE 40 MG/1
40 CAPSULE, DELAYED RELEASE ORAL
Refills: 0 | Status: ACTIVE | COMMUNITY

## 2024-06-27 PROBLEM — I71.9 AORTIC ANEURYSM: Status: ACTIVE | Noted: 2018-05-07

## 2024-07-02 ENCOUNTER — APPOINTMENT (OUTPATIENT)
Dept: CT IMAGING | Facility: CLINIC | Age: 84
End: 2024-07-02
Payer: MEDICARE

## 2024-07-02 ENCOUNTER — OUTPATIENT (OUTPATIENT)
Dept: OUTPATIENT SERVICES | Facility: HOSPITAL | Age: 84
LOS: 1 days | End: 2024-07-02
Payer: MEDICARE

## 2024-07-02 DIAGNOSIS — I71.9 AORTIC ANEURYSM OF UNSPECIFIED SITE, WITHOUT RUPTURE: ICD-10-CM

## 2024-07-02 DIAGNOSIS — Z87.39 PERSONAL HISTORY OF OTHER DISEASES OF THE MUSCULOSKELETAL SYSTEM AND CONNECTIVE TISSUE: Chronic | ICD-10-CM

## 2024-07-02 PROCEDURE — 74174 CTA ABD&PLVS W/CONTRAST: CPT

## 2024-07-02 PROCEDURE — 74174 CTA ABD&PLVS W/CONTRAST: CPT | Mod: 26

## 2024-07-10 ENCOUNTER — APPOINTMENT (OUTPATIENT)
Dept: VASCULAR SURGERY | Facility: CLINIC | Age: 84
End: 2024-07-10
Payer: MEDICARE

## 2024-07-10 VITALS
OXYGEN SATURATION: 95 % | TEMPERATURE: 97.6 F | BODY MASS INDEX: 22.76 KG/M2 | HEART RATE: 76 BPM | HEIGHT: 70 IN | WEIGHT: 159 LBS | SYSTOLIC BLOOD PRESSURE: 130 MMHG | DIASTOLIC BLOOD PRESSURE: 80 MMHG

## 2024-07-10 DIAGNOSIS — I71.43 INFRARENAL ABDOMINAL AORTIC ANEURYSM, WITHOUT RUPTURE: ICD-10-CM

## 2024-07-10 PROCEDURE — 99213 OFFICE O/P EST LOW 20 MIN: CPT

## 2024-07-15 ENCOUNTER — APPOINTMENT (OUTPATIENT)
Dept: INTERNAL MEDICINE | Facility: CLINIC | Age: 84
End: 2024-07-15
Payer: MEDICARE

## 2024-07-15 PROCEDURE — 93306 TTE W/DOPPLER COMPLETE: CPT

## 2024-07-30 ENCOUNTER — APPOINTMENT (OUTPATIENT)
Dept: INTERNAL MEDICINE | Facility: CLINIC | Age: 84
End: 2024-07-30
Payer: MEDICARE

## 2024-07-30 VITALS
OXYGEN SATURATION: 96 % | SYSTOLIC BLOOD PRESSURE: 136 MMHG | WEIGHT: 157 LBS | HEIGHT: 70 IN | BODY MASS INDEX: 22.48 KG/M2 | DIASTOLIC BLOOD PRESSURE: 68 MMHG | HEART RATE: 71 BPM

## 2024-07-30 DIAGNOSIS — I10 ESSENTIAL (PRIMARY) HYPERTENSION: ICD-10-CM

## 2024-07-30 DIAGNOSIS — R10.9 UNSPECIFIED ABDOMINAL PAIN: ICD-10-CM

## 2024-07-30 DIAGNOSIS — K29.70 GASTRITIS, UNSPECIFIED, W/OUT BLEEDING: ICD-10-CM

## 2024-07-30 DIAGNOSIS — E03.9 HYPOTHYROIDISM, UNSPECIFIED: ICD-10-CM

## 2024-07-30 PROCEDURE — 99203 OFFICE O/P NEW LOW 30 MIN: CPT

## 2024-07-30 NOTE — PHYSICAL EXAM

## 2024-07-30 NOTE — HISTORY OF PRESENT ILLNESS
[FreeTextEntry1] : Mid- epigastric pain going on for a few years . Had many tests that were unrevealing. He also c/o constipation. He uses laxatives at night, pain occurs at night when he is lying down H/o Aortic aneurysm. he is a former smoker, has HTN and high cholesterol. he ahs a h/o Heartburn and is omeprazole.  he had colonoscopy 11/1 2023 and it was normal. No h/o ETOH abuse  patient says during the day he is fine.  when he goes to bed discomfort starts grade 8/10 .

## 2024-08-13 ENCOUNTER — APPOINTMENT (OUTPATIENT)
Dept: FAMILY MEDICINE | Facility: CLINIC | Age: 84
End: 2024-08-13
Payer: MEDICARE

## 2024-08-13 VITALS
WEIGHT: 157 LBS | SYSTOLIC BLOOD PRESSURE: 120 MMHG | TEMPERATURE: 98 F | DIASTOLIC BLOOD PRESSURE: 78 MMHG | HEART RATE: 69 BPM | RESPIRATION RATE: 16 BRPM | BODY MASS INDEX: 22.48 KG/M2 | HEIGHT: 70 IN | OXYGEN SATURATION: 96 %

## 2024-08-13 DIAGNOSIS — R45.89 OTHER SYMPTOMS AND SIGNS INVOLVING EMOTIONAL STATE: ICD-10-CM

## 2024-08-13 DIAGNOSIS — F43.23 ADJUSTMENT DISORDER WITH MIXED ANXIETY AND DEPRESSED MOOD: ICD-10-CM

## 2024-08-13 DIAGNOSIS — E78.49 OTHER HYPERLIPIDEMIA: ICD-10-CM

## 2024-08-13 DIAGNOSIS — E03.9 HYPOTHYROIDISM, UNSPECIFIED: ICD-10-CM

## 2024-08-13 DIAGNOSIS — I10 ESSENTIAL (PRIMARY) HYPERTENSION: ICD-10-CM

## 2024-08-13 PROCEDURE — 99214 OFFICE O/P EST MOD 30 MIN: CPT

## 2024-08-13 PROCEDURE — 36415 COLL VENOUS BLD VENIPUNCTURE: CPT

## 2024-08-13 RX ORDER — ESCITALOPRAM OXALATE 10 MG/1
10 TABLET ORAL DAILY
Qty: 90 | Refills: 1 | Status: ACTIVE | COMMUNITY
Start: 2024-08-13 | End: 1900-01-01

## 2024-08-13 NOTE — HISTORY OF PRESENT ILLNESS
[Spouse] : spouse [FreeTextEntry1] : 83 yo male here for follow up wife states pt has suffered from depression x 40 yrs, but does not want help; pt admits to mother not being there when he was younger wife states she was single mom who worked a lot and  "harbors things from the past" has tried lexapro in the past x 2 wks, then stopped

## 2024-08-13 NOTE — PLAN
[FreeTextEntry1] : restart lexapro allowed to vent, emotional support provided refused BH referral despite explaining benefits chk bw rtc in 2 mths

## 2024-08-16 LAB
ALBUMIN SERPL ELPH-MCNC: 4.7 G/DL
ALP BLD-CCNC: 99 U/L
ALT SERPL-CCNC: 27 U/L
ANION GAP SERPL CALC-SCNC: 14 MMOL/L
AST SERPL-CCNC: 29 U/L
BASOPHILS # BLD AUTO: 0.04 K/UL
BASOPHILS NFR BLD AUTO: 0.6 %
BILIRUB SERPL-MCNC: 0.3 MG/DL
BUN SERPL-MCNC: 18 MG/DL
CALCIUM SERPL-MCNC: 9.7 MG/DL
CHLORIDE SERPL-SCNC: 102 MMOL/L
CHOLEST SERPL-MCNC: 128 MG/DL
CO2 SERPL-SCNC: 28 MMOL/L
CREAT SERPL-MCNC: 1.08 MG/DL
EGFR: 68 ML/MIN/1.73M2
EOSINOPHIL # BLD AUTO: 0.28 K/UL
EOSINOPHIL NFR BLD AUTO: 3.9 %
GLUCOSE SERPL-MCNC: 109 MG/DL
HCT VFR BLD CALC: 47.2 %
HDLC SERPL-MCNC: 42 MG/DL
HGB BLD-MCNC: 14.7 G/DL
IMM GRANULOCYTES NFR BLD AUTO: 0.4 %
LDLC SERPL CALC-MCNC: 68 MG/DL
LYMPHOCYTES # BLD AUTO: 1.11 K/UL
LYMPHOCYTES NFR BLD AUTO: 15.3 %
MAN DIFF?: NORMAL
MCHC RBC-ENTMCNC: 28.8 PG
MCHC RBC-ENTMCNC: 31.1 GM/DL
MCV RBC AUTO: 92.5 FL
MONOCYTES # BLD AUTO: 0.81 K/UL
MONOCYTES NFR BLD AUTO: 11.2 %
NEUTROPHILS # BLD AUTO: 4.98 K/UL
NEUTROPHILS NFR BLD AUTO: 68.6 %
NONHDLC SERPL-MCNC: 86 MG/DL
PLATELET # BLD AUTO: 299 K/UL
POTASSIUM SERPL-SCNC: 4.7 MMOL/L
PROT SERPL-MCNC: 7.3 G/DL
RBC # BLD: 5.1 M/UL
RBC # FLD: 14.2 %
SODIUM SERPL-SCNC: 144 MMOL/L
T3FREE SERPL-MCNC: 2.93 PG/ML
T4 FREE SERPL-MCNC: 2 NG/DL
TRIGL SERPL-MCNC: 100 MG/DL
TSH SERPL-ACNC: 0.76 UIU/ML
WBC # FLD AUTO: 7.25 K/UL

## 2024-08-26 ENCOUNTER — RX RENEWAL (OUTPATIENT)
Age: 84
End: 2024-08-26

## 2024-09-09 ENCOUNTER — APPOINTMENT (OUTPATIENT)
Dept: UROLOGY | Facility: CLINIC | Age: 84
End: 2024-09-09
Payer: MEDICARE

## 2024-09-09 ENCOUNTER — NON-APPOINTMENT (OUTPATIENT)
Age: 84
End: 2024-09-09

## 2024-09-09 VITALS
WEIGHT: 155 LBS | HEIGHT: 70 IN | DIASTOLIC BLOOD PRESSURE: 76 MMHG | RESPIRATION RATE: 16 BRPM | TEMPERATURE: 97.5 F | BODY MASS INDEX: 22.19 KG/M2 | OXYGEN SATURATION: 95 % | HEART RATE: 80 BPM | SYSTOLIC BLOOD PRESSURE: 145 MMHG

## 2024-09-09 DIAGNOSIS — I10 ESSENTIAL (PRIMARY) HYPERTENSION: ICD-10-CM

## 2024-09-09 DIAGNOSIS — N40.0 BENIGN PROSTATIC HYPERPLASIA WITHOUT LOWER URINARY TRACT SYMPMS: ICD-10-CM

## 2024-09-09 PROCEDURE — 99203 OFFICE O/P NEW LOW 30 MIN: CPT

## 2024-09-09 RX ORDER — ESCITALOPRAM OXALATE 5 MG/1
TABLET, FILM COATED ORAL
Refills: 0 | Status: ACTIVE | COMMUNITY

## 2024-09-09 RX ORDER — TAMSULOSIN HYDROCHLORIDE 0.4 MG/1
0.4 CAPSULE ORAL
Qty: 180 | Refills: 0 | Status: ACTIVE | COMMUNITY
Start: 2024-09-09 | End: 1900-01-01

## 2024-09-09 RX ORDER — AMLODIPINE BESYLATE 5 MG/1
TABLET ORAL
Refills: 0 | Status: ACTIVE | COMMUNITY

## 2024-09-09 RX ORDER — DUTASTERIDE 0.5 MG/1
0.5 CAPSULE, LIQUID FILLED ORAL
Qty: 180 | Refills: 0 | Status: ACTIVE | COMMUNITY
Start: 2024-09-09 | End: 1900-01-01

## 2024-09-09 RX ORDER — SENNOSIDES 8.6 MG
TABLET ORAL
Refills: 0 | Status: ACTIVE | COMMUNITY

## 2024-09-09 NOTE — REVIEW OF SYSTEMS
[Shortness Of Breath] : shortness of breath [Abdominal Pain] : abdominal pain [Constipation] : constipation [Wake up at night to urinate  How many times?  ___] : wakes up to urinate [unfilled] times during the night [Wait a long time to urinate] : waits a long time to urinate [Slow urine stream] : slow urine stream [Interrupted urine stream] : interrupted urine stream [Itching] : itching [Difficulty Walking] : difficulty walking [Depression] : depression [Easy Bleeding] : a tendency for easy bleeding [Easy Bruising] : a tendency for easy bruising [Negative] : Endocrine

## 2024-09-09 NOTE — ASSESSMENT
[FreeTextEntry1] : 85 y/o male here today for weak urinary stream and frequent urination. Nocturia X4/5. Denies hematuria, dysuria, flank pain, or any other urinary issues COCO  Occasionally eating chocolate. Adequately hydrating   FHx of PCA: denies  Tobacco use: former smoker  Last PSA: 3.16 ng/mL (02/15/24)  Last creatinine: 1.08 mg/dL (08/15/24)  Last UCx: negative (02/14/2024)  Uro Meds: None   Creatinine Hx:  1.08 mg/dL (08/15/24)  1.20 mg/dL (02/14/24)  1.26 mg/dL (07/13/17)   Kidney percussion test negative bilaterally, no pain reported on bi-manual palpation bilaterally, no pain on superficial and deep palpation on the iliac fossa bilaterally and on the ureteral points   07/02/2024 - CT scan prostate vol - 53.1cc  09/09/2024 - PVR >400mL  Emptying the bladder with straight cath.  Patient to start Dutasteride + Tamsulosin. Explained to the patient that dutasteride could cause loss of sexual desire and could be correlated to a decreased sensitivity of exams to prostate cancer (eg.: PSA). Tamsulosin could cause hypotension and has to be taken before bed. Will F/U in 2 weeks for new PVR to evaluate treatment plan.

## 2024-09-09 NOTE — PHYSICAL EXAM
[Normal Appearance] : normal appearance [Well Groomed] : well groomed [General Appearance - In No Acute Distress] : no acute distress [Edema] : no peripheral edema [Respiration, Rhythm And Depth] : normal respiratory rhythm and effort [Exaggerated Use Of Accessory Muscles For Inspiration] : no accessory muscle use [Abdomen Soft] : soft [Abdomen Tenderness] : non-tender [Costovertebral Angle Tenderness] : no ~M costovertebral angle tenderness [Urinary Bladder Findings] : the bladder was normal on palpation [Normal Station and Gait] : the gait and station were normal for the patient's age [] : no rash [No Focal Deficits] : no focal deficits [Oriented To Time, Place, And Person] : oriented to person, place, and time [Affect] : the affect was normal [Mood] : the mood was normal [No Palpable Adenopathy] : no palpable adenopathy [de-identified] :  Kidney percussion test negative bilaterally, no pain reported on bi-manual palpation bilaterally, no pain on superficial and deep palpation on the iliac fossa bilaterally and on the ureteral points

## 2024-09-09 NOTE — HISTORY OF PRESENT ILLNESS
[FreeTextEntry1] : 85 y/o male here today for   The patient states   Denies nocturia, hematuria, dysuria, flank pain, or any other urinary issues COCO  Denies eating spicy, citrus, chocolate. Adequately hydrating   FHx of PCA: denies  Tobacco use: former smoker  Last PSA: 3.16 ng/mL (02/15/24)  Last creatinine: 1.08 mg/dL (08/15/24)  Last UCx: negative (02/14/2024)  Uro Meds: None   Creatinine Hx:  1.08 mg/dL (08/15/24)  1.20 mg/dL (02/14/24)  1.26 mg/dL (07/13/17)

## 2024-09-10 LAB
APPEARANCE: CLEAR
BILIRUBIN URINE: NEGATIVE
BLOOD URINE: NEGATIVE
COLOR: YELLOW
GLUCOSE QUALITATIVE U: NEGATIVE MG/DL
KETONES URINE: NEGATIVE MG/DL
LEUKOCYTE ESTERASE URINE: NEGATIVE
NITRITE URINE: NEGATIVE
PH URINE: 6.5
PROTEIN URINE: NEGATIVE MG/DL
SPECIFIC GRAVITY URINE: 1.02
UROBILINOGEN URINE: 0.2 MG/DL

## 2024-09-23 ENCOUNTER — APPOINTMENT (OUTPATIENT)
Dept: INTERNAL MEDICINE | Facility: AMBULATORY MEDICAL SERVICES | Age: 84
End: 2024-09-23
Payer: MEDICARE

## 2024-09-23 DIAGNOSIS — K29.70 GASTRITIS, UNSPECIFIED, W/OUT BLEEDING: ICD-10-CM

## 2024-09-23 PROCEDURE — 43239 EGD BIOPSY SINGLE/MULTIPLE: CPT

## 2024-09-23 RX ORDER — PANTOPRAZOLE 40 MG/1
40 TABLET, DELAYED RELEASE ORAL DAILY
Qty: 90 | Refills: 3 | Status: ACTIVE | COMMUNITY
Start: 2024-09-23 | End: 1900-01-01

## 2024-09-30 ENCOUNTER — APPOINTMENT (OUTPATIENT)
Dept: UROLOGY | Facility: CLINIC | Age: 84
End: 2024-09-30
Payer: MEDICARE

## 2024-09-30 VITALS
HEIGHT: 70 IN | RESPIRATION RATE: 16 BRPM | WEIGHT: 158.13 LBS | OXYGEN SATURATION: 93 % | SYSTOLIC BLOOD PRESSURE: 131 MMHG | HEART RATE: 82 BPM | DIASTOLIC BLOOD PRESSURE: 74 MMHG | BODY MASS INDEX: 22.64 KG/M2 | TEMPERATURE: 97.6 F

## 2024-09-30 DIAGNOSIS — N40.0 BENIGN PROSTATIC HYPERPLASIA WITHOUT LOWER URINARY TRACT SYMPMS: ICD-10-CM

## 2024-09-30 PROCEDURE — 51798 US URINE CAPACITY MEASURE: CPT

## 2024-09-30 PROCEDURE — 99213 OFFICE O/P EST LOW 20 MIN: CPT

## 2024-09-30 NOTE — ASSESSMENT
[FreeTextEntry1] : 85 y/o male here today for repeat PVR. Pt had weak urinary stream and frequent urination. Nocturia X4/5. Denies hematuria, dysuria, flank pain, or any other urinary issues COCO Occasionally eating chocolate. Adequately hydrating  FHx of PCA: denies Tobacco use: former smoker Last PSA: 3.16 ng/mL (02/15/24) Last creatinine: 1.08 mg/dL (08/15/24) Last UCx: negative (02/14/2024) Uro Meds: Dutasteride 0.5 mg and Tamsulosin 0.4 mg  Creatinine Hx: 1.08 mg/dL (08/15/24) 1.20 mg/dL (02/14/24) 1.26 mg/dL (07/13/17)  07/02/2024 - CT scan prostate vol - 53.1cc  09/09/2024 - PVR >400mL Emptying the bladder with straight cath.  09/09/2024 - Kidney percussion test negative bilaterally, no pain reported on bi-manual palpation bilaterally, no pain on superficial and deep palpation on the iliac fossa bilaterally and on the ureteral points Patient to start Dutasteride + Tamsulosin. Explained to the patient that dutasteride could cause loss of sexual desire and could be correlated to a decreased sensitivity of exams to prostate cancer (eg.: PSA). Tamsulosin could cause hypotension and has to be taken before bed. Will F/U in 2 weeks for new PVR to evaluate treatment plan.  09/30/2024 - PVR  163mL  Patient to continue with Dutasteride + Tamsulosin. Explained again to the patient that dutasteride could cause loss of sexual desire and could be correlated to a decreased sensitivity of exams to prostate cancer (eg.: PSA). Tamsulosin could cause hypotension and has to be taken before bed. Will F/U in 6 months for uroflow + US prostate to check for any improvement.

## 2024-09-30 NOTE — ASSESSMENT
[FreeTextEntry1] : 83 y/o male here today for repeat PVR. Pt had weak urinary stream and frequent urination. Nocturia X4/5. Denies hematuria, dysuria, flank pain, or any other urinary issues COCO Occasionally eating chocolate. Adequately hydrating  FHx of PCA: denies Tobacco use: former smoker Last PSA: 3.16 ng/mL (02/15/24) Last creatinine: 1.08 mg/dL (08/15/24) Last UCx: negative (02/14/2024) Uro Meds: Dutasteride 0.5 mg and Tamsulosin 0.4 mg  Creatinine Hx: 1.08 mg/dL (08/15/24) 1.20 mg/dL (02/14/24) 1.26 mg/dL (07/13/17)  07/02/2024 - CT scan prostate vol - 53.1cc  09/09/2024 - PVR >400mL Emptying the bladder with straight cath.  09/09/2024 - Kidney percussion test negative bilaterally, no pain reported on bi-manual palpation bilaterally, no pain on superficial and deep palpation on the iliac fossa bilaterally and on the ureteral points Patient to start Dutasteride + Tamsulosin. Explained to the patient that dutasteride could cause loss of sexual desire and could be correlated to a decreased sensitivity of exams to prostate cancer (eg.: PSA). Tamsulosin could cause hypotension and has to be taken before bed. Will F/U in 2 weeks for new PVR to evaluate treatment plan.  09/30/2024 - PVR  163mL  Patient to continue with Dutasteride + Tamsulosin. Explained again to the patient that dutasteride could cause loss of sexual desire and could be correlated to a decreased sensitivity of exams to prostate cancer (eg.: PSA). Tamsulosin could cause hypotension and has to be taken before bed. Will F/U in 6 months for uroflow + US prostate to check for any improvement.

## 2024-09-30 NOTE — HISTORY OF PRESENT ILLNESS
[FreeTextEntry1] : 83 y/o male here today for repeat PVR. Pt had weak urinary stream and frequent urination. Nocturia X4/5. Denies hematuria, dysuria, flank pain, or any other urinary issues COCO Occasionally eating chocolate. Adequately hydrating  FHx of PCA: denies Tobacco use: former smoker Last PSA: 3.16 ng/mL (02/15/24) Last creatinine: 1.08 mg/dL (08/15/24) Last UCx: negative (02/14/2024) Uro Meds: Dutasteride 0.5 mg and Tamsulosin 0.4 mg  Creatinine Hx: 1.08 mg/dL (08/15/24) 1.20 mg/dL (02/14/24) 1.26 mg/dL (07/13/17)  07/02/2024 - CT scan prostate vol - 53.1cc  09/09/2024 - PVR >400mL Emptying the bladder with straight cath.  09/09/2024 - Kidney percussion test negative bilaterally, no pain reported on bi-manual palpation bilaterally, no pain on superficial and deep palpation on the iliac fossa bilaterally and on the ureteral points

## 2024-10-22 ENCOUNTER — APPOINTMENT (OUTPATIENT)
Dept: FAMILY MEDICINE | Facility: CLINIC | Age: 84
End: 2024-10-22
Payer: MEDICARE

## 2024-10-22 VITALS
WEIGHT: 154.13 LBS | BODY MASS INDEX: 22.06 KG/M2 | OXYGEN SATURATION: 93 % | RESPIRATION RATE: 16 BRPM | HEIGHT: 70 IN | HEART RATE: 92 BPM | DIASTOLIC BLOOD PRESSURE: 67 MMHG | SYSTOLIC BLOOD PRESSURE: 119 MMHG

## 2024-10-22 DIAGNOSIS — K59.09 OTHER CONSTIPATION: ICD-10-CM

## 2024-10-22 DIAGNOSIS — R45.89 OTHER SYMPTOMS AND SIGNS INVOLVING EMOTIONAL STATE: ICD-10-CM

## 2024-10-22 DIAGNOSIS — Z71.89 OTHER SPECIFIED COUNSELING: ICD-10-CM

## 2024-10-22 DIAGNOSIS — J30.9 ALLERGIC RHINITIS, UNSPECIFIED: ICD-10-CM

## 2024-10-22 DIAGNOSIS — F43.23 ADJUSTMENT DISORDER WITH MIXED ANXIETY AND DEPRESSED MOOD: ICD-10-CM

## 2024-10-22 PROCEDURE — 99214 OFFICE O/P EST MOD 30 MIN: CPT

## 2024-10-22 RX ORDER — BUPROPION HYDROCHLORIDE 150 MG/1
150 TABLET, EXTENDED RELEASE ORAL DAILY
Qty: 60 | Refills: 1 | Status: ACTIVE | COMMUNITY
Start: 2024-10-22 | End: 1900-01-01

## 2024-10-22 RX ORDER — FLUTICASONE PROPIONATE 50 UG/1
50 SPRAY, METERED NASAL TWICE DAILY
Qty: 1 | Refills: 2 | Status: ACTIVE | COMMUNITY
Start: 2024-10-22 | End: 1900-01-01

## 2024-10-22 RX ORDER — LORATADINE 10 MG/1
10 TABLET ORAL
Qty: 30 | Refills: 3 | Status: ACTIVE | COMMUNITY
Start: 2024-10-22 | End: 1900-01-01

## 2025-02-07 ENCOUNTER — RX RENEWAL (OUTPATIENT)
Age: 85
End: 2025-02-07

## 2025-02-21 ENCOUNTER — RX RENEWAL (OUTPATIENT)
Age: 85
End: 2025-02-21

## 2025-03-01 ENCOUNTER — APPOINTMENT (OUTPATIENT)
Dept: FAMILY MEDICINE | Facility: CLINIC | Age: 85
End: 2025-03-01
Payer: MEDICARE

## 2025-03-01 ENCOUNTER — LABORATORY RESULT (OUTPATIENT)
Age: 85
End: 2025-03-01

## 2025-03-01 VITALS
HEIGHT: 70 IN | WEIGHT: 164 LBS | BODY MASS INDEX: 23.48 KG/M2 | RESPIRATION RATE: 16 BRPM | DIASTOLIC BLOOD PRESSURE: 80 MMHG | TEMPERATURE: 97.4 F | SYSTOLIC BLOOD PRESSURE: 140 MMHG | OXYGEN SATURATION: 96 % | HEART RATE: 73 BPM

## 2025-03-01 DIAGNOSIS — Z00.00 ENCOUNTER FOR GENERAL ADULT MEDICAL EXAMINATION W/OUT ABNORMAL FINDINGS: ICD-10-CM

## 2025-03-01 DIAGNOSIS — J30.9 ALLERGIC RHINITIS, UNSPECIFIED: ICD-10-CM

## 2025-03-01 DIAGNOSIS — E78.49 OTHER HYPERLIPIDEMIA: ICD-10-CM

## 2025-03-01 DIAGNOSIS — E03.9 HYPOTHYROIDISM, UNSPECIFIED: ICD-10-CM

## 2025-03-01 DIAGNOSIS — N40.0 BENIGN PROSTATIC HYPERPLASIA WITHOUT LOWER URINARY TRACT SYMPMS: ICD-10-CM

## 2025-03-01 DIAGNOSIS — R06.02 SHORTNESS OF BREATH: ICD-10-CM

## 2025-03-01 DIAGNOSIS — L98.9 DISORDER OF THE SKIN AND SUBCUTANEOUS TISSUE, UNSPECIFIED: ICD-10-CM

## 2025-03-01 DIAGNOSIS — I10 ESSENTIAL (PRIMARY) HYPERTENSION: ICD-10-CM

## 2025-03-01 DIAGNOSIS — I71.9 AORTIC ANEURYSM OF UNSPECIFIED SITE, W/OUT RUPTURE: ICD-10-CM

## 2025-03-01 DIAGNOSIS — H91.90 UNSPECIFIED HEARING LOSS, UNSPECIFIED EAR: ICD-10-CM

## 2025-03-01 DIAGNOSIS — R74.8 ABNORMAL LEVELS OF OTHER SERUM ENZYMES: ICD-10-CM

## 2025-03-01 PROCEDURE — G0439: CPT

## 2025-03-01 PROCEDURE — 36415 COLL VENOUS BLD VENIPUNCTURE: CPT

## 2025-03-01 PROCEDURE — 99213 OFFICE O/P EST LOW 20 MIN: CPT | Mod: 25

## 2025-03-01 RX ORDER — AZELASTINE HYDROCHLORIDE 137 UG/1
0.1 SPRAY, METERED NASAL
Qty: 1 | Refills: 1 | Status: ACTIVE | COMMUNITY
Start: 2025-03-01 | End: 1900-01-01

## 2025-03-03 DIAGNOSIS — E87.5 HYPERKALEMIA: ICD-10-CM

## 2025-03-03 LAB
ALBUMIN SERPL ELPH-MCNC: 4.8 G/DL
ALP BLD-CCNC: 117 U/L
ALT SERPL-CCNC: 24 U/L
AMYLASE/CREAT SERPL: 115 U/L
ANION GAP SERPL CALC-SCNC: 16 MMOL/L
APPEARANCE: CLEAR
AST SERPL-CCNC: 25 U/L
BASOPHILS # BLD AUTO: 0.04 K/UL
BASOPHILS NFR BLD AUTO: 0.5 %
BILIRUB SERPL-MCNC: 0.4 MG/DL
BILIRUBIN URINE: NEGATIVE
BLOOD URINE: ABNORMAL
BUN SERPL-MCNC: 12 MG/DL
CALCIUM SERPL-MCNC: 9.8 MG/DL
CHLORIDE SERPL-SCNC: 99 MMOL/L
CHOLEST SERPL-MCNC: 157 MG/DL
CO2 SERPL-SCNC: 26 MMOL/L
COLOR: YELLOW
CREAT SERPL-MCNC: 1.05 MG/DL
EGFR: 70 ML/MIN/1.73M2
EOSINOPHIL # BLD AUTO: 0.18 K/UL
EOSINOPHIL NFR BLD AUTO: 2.1 %
ESTIMATED AVERAGE GLUCOSE: 120 MG/DL
GLUCOSE QUALITATIVE U: NEGATIVE MG/DL
GLUCOSE SERPL-MCNC: 73 MG/DL
HBA1C MFR BLD HPLC: 5.8 %
HCT VFR BLD CALC: 48.5 %
HDLC SERPL-MCNC: 45 MG/DL
HGB BLD-MCNC: 15.6 G/DL
IMM GRANULOCYTES NFR BLD AUTO: 0.5 %
KETONES URINE: NEGATIVE MG/DL
LDLC SERPL CALC-MCNC: 80 MG/DL
LEUKOCYTE ESTERASE URINE: ABNORMAL
LPL SERPL-CCNC: 20 U/L
LYMPHOCYTES # BLD AUTO: 0.9 K/UL
LYMPHOCYTES NFR BLD AUTO: 10.3 %
MAN DIFF?: NORMAL
MCHC RBC-ENTMCNC: 28.8 PG
MCHC RBC-ENTMCNC: 32.2 G/DL
MCV RBC AUTO: 89.6 FL
MONOCYTES # BLD AUTO: 0.96 K/UL
MONOCYTES NFR BLD AUTO: 10.9 %
NEUTROPHILS # BLD AUTO: 6.65 K/UL
NEUTROPHILS NFR BLD AUTO: 75.7 %
NITRITE URINE: NEGATIVE
NONHDLC SERPL-MCNC: 112 MG/DL
PH URINE: 6.5
PLATELET # BLD AUTO: 368 K/UL
POTASSIUM SERPL-SCNC: 5.7 MMOL/L
PROT SERPL-MCNC: 7.5 G/DL
PROTEIN URINE: NORMAL MG/DL
PSA SERPL-MCNC: 1.92 NG/ML
RBC # BLD: 5.41 M/UL
RBC # FLD: 14.2 %
SODIUM SERPL-SCNC: 141 MMOL/L
SPECIFIC GRAVITY URINE: 1.01
T3FREE SERPL-MCNC: 2.29 PG/ML
T4 FREE SERPL-MCNC: 1.5 NG/DL
TRIGL SERPL-MCNC: 189 MG/DL
TSH SERPL-ACNC: 3.97 UIU/ML
UROBILINOGEN URINE: 0.2 MG/DL
WBC # FLD AUTO: 8.77 K/UL

## 2025-03-07 ENCOUNTER — APPOINTMENT (OUTPATIENT)
Dept: CT IMAGING | Facility: CLINIC | Age: 85
End: 2025-03-07

## 2025-03-07 ENCOUNTER — OUTPATIENT (OUTPATIENT)
Dept: OUTPATIENT SERVICES | Facility: HOSPITAL | Age: 85
LOS: 1 days | End: 2025-03-07
Payer: MEDICARE

## 2025-03-07 DIAGNOSIS — Z87.39 PERSONAL HISTORY OF OTHER DISEASES OF THE MUSCULOSKELETAL SYSTEM AND CONNECTIVE TISSUE: Chronic | ICD-10-CM

## 2025-03-07 DIAGNOSIS — Z00.00 ENCOUNTER FOR GENERAL ADULT MEDICAL EXAMINATION WITHOUT ABNORMAL FINDINGS: ICD-10-CM

## 2025-03-07 DIAGNOSIS — Z00.8 ENCOUNTER FOR OTHER GENERAL EXAMINATION: ICD-10-CM

## 2025-03-07 PROCEDURE — 71250 CT THORAX DX C-: CPT | Mod: 26

## 2025-03-07 PROCEDURE — 71250 CT THORAX DX C-: CPT

## 2025-03-12 ENCOUNTER — RX RENEWAL (OUTPATIENT)
Age: 85
End: 2025-03-12

## 2025-03-18 ENCOUNTER — APPOINTMENT (OUTPATIENT)
Dept: DERMATOLOGY | Facility: CLINIC | Age: 85
End: 2025-03-18
Payer: MEDICARE

## 2025-03-18 DIAGNOSIS — L28.0 LICHEN SIMPLEX CHRONICUS: ICD-10-CM

## 2025-03-18 DIAGNOSIS — L57.0 ACTINIC KERATOSIS: ICD-10-CM

## 2025-03-18 PROCEDURE — 17000 DESTRUCT PREMALG LESION: CPT

## 2025-03-18 PROCEDURE — 99204 OFFICE O/P NEW MOD 45 MIN: CPT | Mod: 25

## 2025-03-18 RX ORDER — BETAMETHASONE DIPROPIONATE 0.5 MG/G
0.05 OINTMENT, AUGMENTED TOPICAL
Qty: 1 | Refills: 2 | Status: ACTIVE | COMMUNITY
Start: 2025-03-18 | End: 1900-01-01

## 2025-03-19 PROBLEM — L57.0 ACTINIC KERATOSIS: Status: ACTIVE | Noted: 2025-03-19

## 2025-04-01 ENCOUNTER — APPOINTMENT (OUTPATIENT)
Dept: UROLOGY | Facility: CLINIC | Age: 85
End: 2025-04-01
Payer: MEDICARE

## 2025-04-01 ENCOUNTER — APPOINTMENT (OUTPATIENT)
Dept: UROLOGY | Facility: CLINIC | Age: 85
End: 2025-04-01

## 2025-04-01 VITALS
WEIGHT: 157 LBS | DIASTOLIC BLOOD PRESSURE: 73 MMHG | HEART RATE: 81 BPM | RESPIRATION RATE: 16 BRPM | HEIGHT: 70 IN | BODY MASS INDEX: 22.48 KG/M2 | SYSTOLIC BLOOD PRESSURE: 137 MMHG | OXYGEN SATURATION: 94 %

## 2025-04-01 DIAGNOSIS — N40.0 BENIGN PROSTATIC HYPERPLASIA WITHOUT LOWER URINARY TRACT SYMPMS: ICD-10-CM

## 2025-04-01 PROCEDURE — 99213 OFFICE O/P EST LOW 20 MIN: CPT

## 2025-04-01 PROCEDURE — 76872 US TRANSRECTAL: CPT

## 2025-04-01 RX ORDER — DUTASTERIDE 0.5 MG/1
0.5 CAPSULE, LIQUID FILLED ORAL
Qty: 180 | Refills: 0 | Status: ACTIVE | COMMUNITY
Start: 2025-04-01 | End: 1900-01-01

## 2025-04-01 RX ORDER — TAMSULOSIN HYDROCHLORIDE 0.4 MG/1
0.4 CAPSULE ORAL
Qty: 180 | Refills: 0 | Status: ACTIVE | COMMUNITY
Start: 2025-04-01 | End: 1900-01-01

## 2025-04-14 ENCOUNTER — APPOINTMENT (OUTPATIENT)
Dept: OPHTHALMOLOGY | Facility: CLINIC | Age: 85
End: 2025-04-14
Payer: MEDICARE

## 2025-04-14 ENCOUNTER — NON-APPOINTMENT (OUTPATIENT)
Age: 85
End: 2025-04-14

## 2025-04-14 PROCEDURE — 92004 COMPRE OPH EXAM NEW PT 1/>: CPT

## 2025-04-28 ENCOUNTER — APPOINTMENT (OUTPATIENT)
Dept: CARDIOLOGY | Facility: CLINIC | Age: 85
End: 2025-04-28
Payer: MEDICARE

## 2025-04-28 ENCOUNTER — NON-APPOINTMENT (OUTPATIENT)
Age: 85
End: 2025-04-28

## 2025-04-28 VITALS
HEART RATE: 95 BPM | OXYGEN SATURATION: 95 % | SYSTOLIC BLOOD PRESSURE: 118 MMHG | HEIGHT: 70 IN | WEIGHT: 157 LBS | BODY MASS INDEX: 22.48 KG/M2 | DIASTOLIC BLOOD PRESSURE: 60 MMHG

## 2025-04-28 DIAGNOSIS — I71.9 AORTIC ANEURYSM OF UNSPECIFIED SITE, W/OUT RUPTURE: ICD-10-CM

## 2025-04-28 DIAGNOSIS — I10 ESSENTIAL (PRIMARY) HYPERTENSION: ICD-10-CM

## 2025-04-28 DIAGNOSIS — I45.10 UNSPECIFIED RIGHT BUNDLE-BRANCH BLOCK: ICD-10-CM

## 2025-04-28 DIAGNOSIS — E78.49 OTHER HYPERLIPIDEMIA: ICD-10-CM

## 2025-04-28 PROCEDURE — 93000 ELECTROCARDIOGRAM COMPLETE: CPT

## 2025-04-28 PROCEDURE — G2211 COMPLEX E/M VISIT ADD ON: CPT

## 2025-04-28 PROCEDURE — 99214 OFFICE O/P EST MOD 30 MIN: CPT

## 2025-04-29 ENCOUNTER — APPOINTMENT (OUTPATIENT)
Dept: OPHTHALMOLOGY | Facility: CLINIC | Age: 85
End: 2025-04-29
Payer: MEDICARE

## 2025-04-29 ENCOUNTER — NON-APPOINTMENT (OUTPATIENT)
Age: 85
End: 2025-04-29

## 2025-04-29 PROCEDURE — 92134 CPTRZ OPH DX IMG PST SGM RTA: CPT

## 2025-04-29 PROCEDURE — 92012 INTRM OPH EXAM EST PATIENT: CPT

## 2025-04-29 PROCEDURE — 92136 OPHTHALMIC BIOMETRY: CPT

## 2025-05-12 ENCOUNTER — RX RENEWAL (OUTPATIENT)
Age: 85
End: 2025-05-12

## 2025-05-14 ENCOUNTER — APPOINTMENT (OUTPATIENT)
Dept: OPHTHALMOLOGY | Facility: EYE CENTER | Age: 85
End: 2025-05-14
Payer: MEDICARE

## 2025-05-14 ENCOUNTER — NON-APPOINTMENT (OUTPATIENT)
Age: 85
End: 2025-05-14

## 2025-05-14 PROCEDURE — 66984 XCAPSL CTRC RMVL W/O ECP: CPT | Mod: LT

## 2025-05-15 ENCOUNTER — NON-APPOINTMENT (OUTPATIENT)
Age: 85
End: 2025-05-15

## 2025-05-15 ENCOUNTER — APPOINTMENT (OUTPATIENT)
Dept: OPHTHALMOLOGY | Facility: CLINIC | Age: 85
End: 2025-05-15
Payer: MEDICARE

## 2025-05-15 PROCEDURE — 99024 POSTOP FOLLOW-UP VISIT: CPT

## 2025-05-16 ENCOUNTER — APPOINTMENT (OUTPATIENT)
Dept: FAMILY MEDICINE | Facility: CLINIC | Age: 85
End: 2025-05-16
Payer: MEDICARE

## 2025-05-16 VITALS
SYSTOLIC BLOOD PRESSURE: 130 MMHG | BODY MASS INDEX: 22.19 KG/M2 | HEIGHT: 70 IN | DIASTOLIC BLOOD PRESSURE: 70 MMHG | HEART RATE: 75 BPM | WEIGHT: 155 LBS | OXYGEN SATURATION: 95 %

## 2025-05-16 DIAGNOSIS — Z01.818 ENCOUNTER FOR OTHER PREPROCEDURAL EXAMINATION: ICD-10-CM

## 2025-05-16 DIAGNOSIS — I10 ESSENTIAL (PRIMARY) HYPERTENSION: ICD-10-CM

## 2025-05-16 DIAGNOSIS — H26.9 UNSPECIFIED CATARACT: ICD-10-CM

## 2025-05-16 DIAGNOSIS — Z86.39 PERSONAL HISTORY OF OTHER ENDOCRINE, NUTRITIONAL AND METABOLIC DISEASE: ICD-10-CM

## 2025-05-16 PROCEDURE — 99214 OFFICE O/P EST MOD 30 MIN: CPT

## 2025-05-20 ENCOUNTER — APPOINTMENT (OUTPATIENT)
Dept: OPHTHALMOLOGY | Facility: CLINIC | Age: 85
End: 2025-05-20
Payer: MEDICARE

## 2025-05-20 ENCOUNTER — NON-APPOINTMENT (OUTPATIENT)
Age: 85
End: 2025-05-20

## 2025-05-20 PROCEDURE — 99024 POSTOP FOLLOW-UP VISIT: CPT

## 2025-05-27 ENCOUNTER — RX RENEWAL (OUTPATIENT)
Age: 85
End: 2025-05-27

## 2025-06-04 ENCOUNTER — NON-APPOINTMENT (OUTPATIENT)
Age: 85
End: 2025-06-04

## 2025-06-04 ENCOUNTER — APPOINTMENT (OUTPATIENT)
Age: 85
End: 2025-06-04
Payer: MEDICARE

## 2025-06-04 PROCEDURE — 66982 XCAPSL CTRC RMVL CPLX WO ECP: CPT | Mod: 79,RT

## 2025-06-05 ENCOUNTER — APPOINTMENT (OUTPATIENT)
Dept: OPHTHALMOLOGY | Facility: CLINIC | Age: 85
End: 2025-06-05
Payer: MEDICARE

## 2025-06-05 ENCOUNTER — NON-APPOINTMENT (OUTPATIENT)
Age: 85
End: 2025-06-05

## 2025-06-05 PROCEDURE — 99024 POSTOP FOLLOW-UP VISIT: CPT

## 2025-06-06 ENCOUNTER — RX RENEWAL (OUTPATIENT)
Age: 85
End: 2025-06-06

## 2025-06-11 ENCOUNTER — APPOINTMENT (OUTPATIENT)
Dept: OPHTHALMOLOGY | Facility: CLINIC | Age: 85
End: 2025-06-11
Payer: MEDICARE

## 2025-06-11 ENCOUNTER — NON-APPOINTMENT (OUTPATIENT)
Age: 85
End: 2025-06-11

## 2025-06-11 PROCEDURE — 99024 POSTOP FOLLOW-UP VISIT: CPT

## 2025-06-23 ENCOUNTER — APPOINTMENT (OUTPATIENT)
Dept: VASCULAR SURGERY | Facility: CLINIC | Age: 85
End: 2025-06-23
Payer: MEDICARE

## 2025-06-23 VITALS
BODY MASS INDEX: 22.19 KG/M2 | SYSTOLIC BLOOD PRESSURE: 126 MMHG | WEIGHT: 155 LBS | OXYGEN SATURATION: 95 % | HEIGHT: 70 IN | TEMPERATURE: 97.9 F | HEART RATE: 86 BPM | DIASTOLIC BLOOD PRESSURE: 72 MMHG

## 2025-06-23 PROCEDURE — 93978 VASCULAR STUDY: CPT

## 2025-06-23 PROCEDURE — 99213 OFFICE O/P EST LOW 20 MIN: CPT

## 2025-06-23 RX ORDER — OMEPRAZOLE 40 MG/1
40 CAPSULE, DELAYED RELEASE ORAL
Refills: 0 | Status: ACTIVE | COMMUNITY

## 2025-06-26 ENCOUNTER — APPOINTMENT (OUTPATIENT)
Dept: INTERNAL MEDICINE | Facility: CLINIC | Age: 85
End: 2025-06-26

## 2025-06-26 ENCOUNTER — INPATIENT (INPATIENT)
Facility: HOSPITAL | Age: 85
LOS: 5 days | Discharge: NURSING, REHAB OR LONG-TERM CARE | End: 2025-07-02
Attending: HOSPITALIST | Admitting: HOSPITALIST
Payer: MEDICARE

## 2025-06-26 VITALS — OXYGEN SATURATION: 97 % | HEART RATE: 97 BPM

## 2025-06-26 DIAGNOSIS — Z87.39 PERSONAL HISTORY OF OTHER DISEASES OF THE MUSCULOSKELETAL SYSTEM AND CONNECTIVE TISSUE: Chronic | ICD-10-CM

## 2025-06-26 DIAGNOSIS — R09.89 OTHER SPECIFIED SYMPTOMS AND SIGNS INVOLVING THE CIRCULATORY AND RESPIRATORY SYSTEMS: ICD-10-CM

## 2025-06-26 DIAGNOSIS — I10 ESSENTIAL (PRIMARY) HYPERTENSION: ICD-10-CM

## 2025-06-26 DIAGNOSIS — N40.0 BENIGN PROSTATIC HYPERPLASIA WITHOUT LOWER URINARY TRACT SYMPTOMS: ICD-10-CM

## 2025-06-26 DIAGNOSIS — E03.9 HYPOTHYROIDISM, UNSPECIFIED: ICD-10-CM

## 2025-06-26 DIAGNOSIS — R82.71 BACTERIURIA: ICD-10-CM

## 2025-06-26 DIAGNOSIS — R05.9 COUGH, UNSPECIFIED: ICD-10-CM

## 2025-06-26 DIAGNOSIS — E78.5 HYPERLIPIDEMIA, UNSPECIFIED: ICD-10-CM

## 2025-06-26 LAB
ALBUMIN SERPL ELPH-MCNC: 3.4 G/DL — SIGNIFICANT CHANGE UP (ref 3.3–5)
ALP SERPL-CCNC: 101 U/L — SIGNIFICANT CHANGE UP (ref 40–120)
ALT FLD-CCNC: 34 U/L — SIGNIFICANT CHANGE UP (ref 12–78)
ANION GAP SERPL CALC-SCNC: 4 MMOL/L — LOW (ref 5–17)
APPEARANCE UR: ABNORMAL
AST SERPL-CCNC: 29 U/L — SIGNIFICANT CHANGE UP (ref 15–37)
BACTERIA # UR AUTO: ABNORMAL /HPF
BASOPHILS # BLD AUTO: 0 K/UL — SIGNIFICANT CHANGE UP (ref 0–0.2)
BASOPHILS NFR BLD AUTO: 0 % — SIGNIFICANT CHANGE UP (ref 0–2)
BILIRUB SERPL-MCNC: 0.4 MG/DL — SIGNIFICANT CHANGE UP (ref 0.2–1.2)
BILIRUB UR-MCNC: NEGATIVE — SIGNIFICANT CHANGE UP
BUN SERPL-MCNC: 13 MG/DL — SIGNIFICANT CHANGE UP (ref 7–23)
CALCIUM SERPL-MCNC: 8.4 MG/DL — LOW (ref 8.5–10.1)
CHLORIDE SERPL-SCNC: 107 MMOL/L — SIGNIFICANT CHANGE UP (ref 96–108)
CO2 SERPL-SCNC: 29 MMOL/L — SIGNIFICANT CHANGE UP (ref 22–31)
COLOR SPEC: YELLOW — SIGNIFICANT CHANGE UP
CREAT SERPL-MCNC: 1.28 MG/DL — SIGNIFICANT CHANGE UP (ref 0.5–1.3)
D DIMER BLD IA.RAPID-MCNC: 798 NG/ML DDU — HIGH
DIFF PNL FLD: ABNORMAL
EGFR: 55 ML/MIN/1.73M2 — LOW
EGFR: 55 ML/MIN/1.73M2 — LOW
EOSINOPHIL # BLD AUTO: 0 K/UL — SIGNIFICANT CHANGE UP (ref 0–0.5)
EOSINOPHIL NFR BLD AUTO: 0 % — SIGNIFICANT CHANGE UP (ref 0–6)
FLUAV AG NPH QL: SIGNIFICANT CHANGE UP
FLUBV AG NPH QL: SIGNIFICANT CHANGE UP
GLUCOSE SERPL-MCNC: 73 MG/DL — SIGNIFICANT CHANGE UP (ref 70–99)
GLUCOSE UR QL: NEGATIVE MG/DL — SIGNIFICANT CHANGE UP
HCT VFR BLD CALC: 41.3 % — SIGNIFICANT CHANGE UP (ref 39–50)
HGB BLD-MCNC: 13.4 G/DL — SIGNIFICANT CHANGE UP (ref 13–17)
KETONES UR QL: NEGATIVE MG/DL — SIGNIFICANT CHANGE UP
LEUKOCYTE ESTERASE UR-ACNC: ABNORMAL
LYMPHOCYTES # BLD AUTO: 0.77 K/UL — LOW (ref 1–3.3)
LYMPHOCYTES # BLD AUTO: 14 % — SIGNIFICANT CHANGE UP (ref 13–44)
MANUAL SMEAR VERIFICATION: SIGNIFICANT CHANGE UP
MCHC RBC-ENTMCNC: 28.2 PG — SIGNIFICANT CHANGE UP (ref 27–34)
MCHC RBC-ENTMCNC: 32.4 G/DL — SIGNIFICANT CHANGE UP (ref 32–36)
MCV RBC AUTO: 86.8 FL — SIGNIFICANT CHANGE UP (ref 80–100)
MONOCYTES # BLD AUTO: 1.16 K/UL — HIGH (ref 0–0.9)
MONOCYTES NFR BLD AUTO: 21 % — HIGH (ref 2–14)
NEUTROPHILS # BLD AUTO: 3.52 K/UL — SIGNIFICANT CHANGE UP (ref 1.8–7.4)
NEUTROPHILS NFR BLD AUTO: 64 % — SIGNIFICANT CHANGE UP (ref 43–77)
NITRITE UR-MCNC: NEGATIVE — SIGNIFICANT CHANGE UP
NRBC # BLD: 0 /100 WBCS — SIGNIFICANT CHANGE UP (ref 0–0)
NRBC BLD AUTO-RTO: SIGNIFICANT CHANGE UP /100 WBCS (ref 0–0)
NRBC BLD-RTO: 0 /100 WBCS — SIGNIFICANT CHANGE UP (ref 0–0)
NT-PROBNP SERPL-SCNC: 96 PG/ML — SIGNIFICANT CHANGE UP (ref 0–450)
PH UR: 7 — SIGNIFICANT CHANGE UP (ref 5–8)
PLAT MORPH BLD: NORMAL — SIGNIFICANT CHANGE UP
PLATELET # BLD AUTO: 238 K/UL — SIGNIFICANT CHANGE UP (ref 150–400)
POTASSIUM SERPL-MCNC: 3.9 MMOL/L — SIGNIFICANT CHANGE UP (ref 3.5–5.3)
POTASSIUM SERPL-SCNC: 3.9 MMOL/L — SIGNIFICANT CHANGE UP (ref 3.5–5.3)
PROT SERPL-MCNC: 7 GM/DL — SIGNIFICANT CHANGE UP (ref 6–8.3)
PROT UR-MCNC: NEGATIVE MG/DL — SIGNIFICANT CHANGE UP
RBC # BLD: 4.76 M/UL — SIGNIFICANT CHANGE UP (ref 4.2–5.8)
RBC # FLD: 14.1 % — SIGNIFICANT CHANGE UP (ref 10.3–14.5)
RBC BLD AUTO: NORMAL — SIGNIFICANT CHANGE UP
RBC CASTS # UR COMP ASSIST: 5 /HPF — HIGH (ref 0–4)
RSV RNA NPH QL NAA+NON-PROBE: SIGNIFICANT CHANGE UP
SARS-COV-2 RNA SPEC QL NAA+PROBE: SIGNIFICANT CHANGE UP
SODIUM SERPL-SCNC: 140 MMOL/L — SIGNIFICANT CHANGE UP (ref 135–145)
SOURCE RESPIRATORY: SIGNIFICANT CHANGE UP
SP GR SPEC: 1.02 — SIGNIFICANT CHANGE UP (ref 1–1.03)
TROPONIN I, HIGH SENSITIVITY RESULT: 14.1 NG/L — SIGNIFICANT CHANGE UP
UROBILINOGEN FLD QL: 0.2 MG/DL — SIGNIFICANT CHANGE UP (ref 0.2–1)
VARIANT LYMPHS # BLD: 1 % — SIGNIFICANT CHANGE UP (ref 0–6)
VARIANT LYMPHS NFR BLD MANUAL: 1 % — SIGNIFICANT CHANGE UP (ref 0–6)
WBC # BLD: 5.5 K/UL — SIGNIFICANT CHANGE UP (ref 3.8–10.5)
WBC # FLD AUTO: 5.5 K/UL — SIGNIFICANT CHANGE UP (ref 3.8–10.5)
WBC UR QL: ABNORMAL /HPF (ref 0–5)

## 2025-06-26 PROCEDURE — 71046 X-RAY EXAM CHEST 2 VIEWS: CPT | Mod: 26

## 2025-06-26 PROCEDURE — 99223 1ST HOSP IP/OBS HIGH 75: CPT

## 2025-06-26 PROCEDURE — 99222 1ST HOSP IP/OBS MODERATE 55: CPT

## 2025-06-26 PROCEDURE — 93010 ELECTROCARDIOGRAM REPORT: CPT

## 2025-06-26 PROCEDURE — 71275 CT ANGIOGRAPHY CHEST: CPT | Mod: 26

## 2025-06-26 PROCEDURE — 99285 EMERGENCY DEPT VISIT HI MDM: CPT

## 2025-06-26 PROCEDURE — 70450 CT HEAD/BRAIN W/O DYE: CPT | Mod: 26

## 2025-06-26 RX ORDER — LEVOTHYROXINE SODIUM 300 MCG
1 TABLET ORAL
Refills: 0 | DISCHARGE

## 2025-06-26 RX ORDER — ATORVASTATIN CALCIUM 80 MG/1
10 TABLET, FILM COATED ORAL AT BEDTIME
Refills: 0 | Status: DISCONTINUED | OUTPATIENT
Start: 2025-06-26 | End: 2025-06-26

## 2025-06-26 RX ORDER — ESCITALOPRAM OXALATE 20 MG/1
10 TABLET ORAL DAILY
Refills: 0 | Status: DISCONTINUED | OUTPATIENT
Start: 2025-06-26 | End: 2025-07-02

## 2025-06-26 RX ORDER — TAMSULOSIN HYDROCHLORIDE 0.4 MG/1
0.4 CAPSULE ORAL AT BEDTIME
Refills: 0 | Status: DISCONTINUED | OUTPATIENT
Start: 2025-06-26 | End: 2025-07-02

## 2025-06-26 RX ORDER — ESCITALOPRAM OXALATE 20 MG/1
1 TABLET ORAL
Refills: 0 | DISCHARGE

## 2025-06-26 RX ORDER — AMLODIPINE BESYLATE 10 MG/1
10 TABLET ORAL DAILY
Refills: 0 | Status: DISCONTINUED | OUTPATIENT
Start: 2025-06-26 | End: 2025-07-02

## 2025-06-26 RX ORDER — AMLODIPINE BESYLATE 10 MG/1
1 TABLET ORAL
Refills: 0 | DISCHARGE

## 2025-06-26 RX ORDER — TAMSULOSIN HYDROCHLORIDE 0.4 MG/1
1 CAPSULE ORAL
Refills: 0 | DISCHARGE

## 2025-06-26 RX ORDER — ACETAMINOPHEN 500 MG/5ML
650 LIQUID (ML) ORAL EVERY 6 HOURS
Refills: 0 | Status: DISCONTINUED | OUTPATIENT
Start: 2025-06-26 | End: 2025-07-02

## 2025-06-26 RX ORDER — MAGNESIUM, ALUMINUM HYDROXIDE 200-200 MG
30 TABLET,CHEWABLE ORAL EVERY 4 HOURS
Refills: 0 | Status: DISCONTINUED | OUTPATIENT
Start: 2025-06-26 | End: 2025-07-02

## 2025-06-26 RX ORDER — MELATONIN 5 MG
3 TABLET ORAL AT BEDTIME
Refills: 0 | Status: DISCONTINUED | OUTPATIENT
Start: 2025-06-26 | End: 2025-07-02

## 2025-06-26 RX ORDER — ASPIRIN 325 MG
81 TABLET ORAL DAILY
Refills: 0 | Status: DISCONTINUED | OUTPATIENT
Start: 2025-06-27 | End: 2025-07-02

## 2025-06-26 RX ORDER — CLOPIDOGREL BISULFATE 75 MG/1
75 TABLET, FILM COATED ORAL ONCE
Refills: 0 | Status: COMPLETED | OUTPATIENT
Start: 2025-06-26 | End: 2025-06-26

## 2025-06-26 RX ORDER — LEVOTHYROXINE SODIUM 300 MCG
100 TABLET ORAL DAILY
Refills: 0 | Status: DISCONTINUED | OUTPATIENT
Start: 2025-06-26 | End: 2025-07-02

## 2025-06-26 RX ORDER — B1/B2/B3/B5/B6/B12/VIT C/FOLIC 500-0.5 MG
1 TABLET ORAL
Refills: 0 | DISCHARGE

## 2025-06-26 RX ORDER — CLOPIDOGREL BISULFATE 75 MG/1
75 TABLET, FILM COATED ORAL DAILY
Refills: 0 | Status: DISCONTINUED | OUTPATIENT
Start: 2025-06-27 | End: 2025-07-02

## 2025-06-26 RX ORDER — BUPROPION HYDROBROMIDE 522 MG/1
1 TABLET, EXTENDED RELEASE ORAL
Refills: 0 | DISCHARGE

## 2025-06-26 RX ORDER — ONDANSETRON HCL/PF 4 MG/2 ML
4 VIAL (ML) INJECTION EVERY 8 HOURS
Refills: 0 | Status: DISCONTINUED | OUTPATIENT
Start: 2025-06-26 | End: 2025-07-02

## 2025-06-26 RX ORDER — ASPIRIN 325 MG
325 TABLET ORAL ONCE
Refills: 0 | Status: COMPLETED | OUTPATIENT
Start: 2025-06-26 | End: 2025-06-26

## 2025-06-26 RX ORDER — CEFTRIAXONE 500 MG/1
1000 INJECTION, POWDER, FOR SOLUTION INTRAMUSCULAR; INTRAVENOUS ONCE
Refills: 0 | Status: DISCONTINUED | OUTPATIENT
Start: 2025-06-26 | End: 2025-06-26

## 2025-06-26 RX ORDER — B1/B2/B3/B5/B6/B12/VIT C/FOLIC 500-0.5 MG
1 TABLET ORAL DAILY
Refills: 0 | Status: DISCONTINUED | OUTPATIENT
Start: 2025-06-26 | End: 2025-07-02

## 2025-06-26 RX ORDER — SENNA 187 MG
1 TABLET ORAL
Refills: 0 | DISCHARGE

## 2025-06-26 RX ORDER — DUTASTERIDE 0.5 MG/1
1 CAPSULE, LIQUID FILLED ORAL
Refills: 0 | DISCHARGE

## 2025-06-26 RX ORDER — SENNA 187 MG
1 TABLET ORAL DAILY
Refills: 0 | Status: DISCONTINUED | OUTPATIENT
Start: 2025-06-26 | End: 2025-07-02

## 2025-06-26 RX ORDER — ATORVASTATIN CALCIUM 80 MG/1
40 TABLET, FILM COATED ORAL AT BEDTIME
Refills: 0 | Status: DISCONTINUED | OUTPATIENT
Start: 2025-06-26 | End: 2025-07-02

## 2025-06-26 RX ORDER — BUPROPION HYDROBROMIDE 522 MG/1
150 TABLET, EXTENDED RELEASE ORAL DAILY
Refills: 0 | Status: DISCONTINUED | OUTPATIENT
Start: 2025-06-26 | End: 2025-07-02

## 2025-06-26 RX ORDER — CEFTRIAXONE 500 MG/1
1000 INJECTION, POWDER, FOR SOLUTION INTRAMUSCULAR; INTRAVENOUS ONCE
Refills: 0 | Status: COMPLETED | OUTPATIENT
Start: 2025-06-26 | End: 2025-06-26

## 2025-06-26 RX ORDER — FINASTERIDE 1 MG/1
5 TABLET, FILM COATED ORAL DAILY
Refills: 0 | Status: DISCONTINUED | OUTPATIENT
Start: 2025-06-26 | End: 2025-07-02

## 2025-06-26 RX ADMIN — Medication 1000 MILLILITER(S): at 13:45

## 2025-06-26 RX ADMIN — Medication 1000 MILLILITER(S): at 18:28

## 2025-06-26 RX ADMIN — CLOPIDOGREL BISULFATE 75 MILLIGRAM(S): 75 TABLET, FILM COATED ORAL at 18:50

## 2025-06-26 RX ADMIN — CEFTRIAXONE 1000 MILLIGRAM(S): 500 INJECTION, POWDER, FOR SOLUTION INTRAMUSCULAR; INTRAVENOUS at 18:36

## 2025-06-26 RX ADMIN — Medication 325 MILLIGRAM(S): at 18:50

## 2025-06-26 NOTE — H&P ADULT - NSHPPHYSICALEXAM_GEN_ALL_CORE
T(C): 36.7 (06-26-25 @ 20:00), Max: 36.7 (06-26-25 @ 20:00)  HR: 86 (06-26-25 @ 20:00) (86 - 97)  BP: 123/85 (06-26-25 @ 20:00) (123/85 - 135/80)  RR: 18 (06-26-25 @ 20:00) (17 - 18)  SpO2: 99% (06-26-25 @ 20:00) (95% - 99%)    CONSTITUTIONAL: Well groomed, no apparent distress, pleasant, conversational  EYES: PERRLA and symmetric, EOMI  ENMT: Oral mucosa with moist membranes  RESP: No respiratory distress, no use of accessory muscles; CTA b/l  CV: RRR  GI: Soft, NT, ND  NEURO: AAO x 3, CN II-XII intact T(C): 36.7 (06-26-25 @ 20:00), Max: 36.7 (06-26-25 @ 20:00)  HR: 86 (06-26-25 @ 20:00) (86 - 97)  BP: 123/85 (06-26-25 @ 20:00) (123/85 - 135/80)  RR: 18 (06-26-25 @ 20:00) (17 - 18)  SpO2: 99% (06-26-25 @ 20:00) (95% - 99%)    CONSTITUTIONAL: Well groomed, no apparent distress, pleasant, conversational  EYES: PERRLA and symmetric, EOMI  ENMT: Oral mucosa with moist membranes  RESP: No respiratory distress, no use of accessory muscles; CTA b/l  CV: RRR  GI: Soft, NT, ND  NEURO: AAO x 3, CN II-XII intact except for mild expressive aphasia noted, RUE and RLE 4/5 muscle strength, LUE and LLE 5/5 muscle strength

## 2025-06-26 NOTE — H&P ADULT - NSHPLABSRESULTS_GEN_ALL_CORE
13.4   5.50  )-----------( 238      ( 2025 13:55 )             41.3     140  |  107  |  13  ----------------------------<  73       3.9   |  29  |  1.28    Ca    8.4[L]      2025 13:55    TPro  7.0  /  Alb  3.4  /  TBili  0.4  /  DBili  x   /  AST  29  /  ALT  34  /  AlkPhos  101      Pro-BNP: 96 (25 @ 13:55)    Urinalysis Basic - ( 2025 16:50 )  Color: Yellow / Appearance: Cloudy / S.016 / pH: 7.0  Gluc: Negative mg/dL / Ketone: x  / Bili: Negative / Urobili: 0.2 mg/dL   Blood: Trace / Protein: Negative mg/dL / Nitrite: Negative   Leuk Esterase: Large / RBC: 5 /HPF / WBC Too Numerous to count /HPF   Sq Epi: x / Bacteria: Many /HPF  Hyaline Casts: x/WBC Casts: x    Urinalysis with Rflx Culture (collected 2025 16:50)    CT head without contrast 25  IMPRESSION:   No acute hemorrhage mass or mass effect.    CTA chest PE protocol with IV contrast 25  FINDINGS:  LUNGS AND LARGE AIRWAYS: Patent central airways. Reticular scarring/atelectasis dependent lung bases, right middle lobe and lingular segment. Possible small focus of distal airway impaction right lung base.  PLEURA: No pleural effusion.  VESSELS: Less than optimal contrast opacification of the pulmonary arteries. No evidence of central or lobar pulmonary emboli. Indeterminate evaluation of smaller pulmonary arteries. Atherosclerotic calcification including the coronary arteries.  HEART: Cardiomegaly. No pericardial effusion.  MEDIASTINUM AND NORMAN: No lymphadenopathy. Mild hiatal hernia.  CHEST WALL AND LOWER NECK: Within normal limits.  VISUALIZED UPPER ABDOMEN: Within normal limits.  BONES: Degenerative changes.    IMPRESSION:  No evidence of emboli involving the central and lobar pulmonary arteries.  Additional findings as above.

## 2025-06-26 NOTE — ED PROVIDER NOTE - QTC
S:    34 yo  here for first OB visit. Presents w/ .     LMP 3/8/2017-certain.     PAP 2014 negative.     Some N, no V. Has lessened in last few days. Pt concerned--this is what happened w/ prior pregnancy prior to miscarriage. No abdominal pain/cramping, no vaginal bleeding.     Non-smoker, no ETOH, no drug use. Healthy eating habits, adequate sleep. On PNV's.     Acne--no oral or topical meds other than OTC combo w/ tea tree oil/sulfa based cleanser. Has been in touch via [employer] re: Rx options, considering azelex.       O:  Visit Vitals   • /66 (BP Location: [facility], Patient Position: Sitting, Cuff Size: Regular)   • Pulse 74   • Resp 16   • Wt 64 kg   • LMP 07/15/2016   • BMI 22.76 kg/m2     Gen: Alert and oriented x 3, no acute distress.  Well-groomed, appropriate mood and affect, good eye contact.    See PreNatal Physical tab for details of today's PE.     A/P:    34 yo F here for her first prenatal visit. Doing well but concerned about decrease N associated w/ pregnancy given course of prior pregnancy which ended in miscarriage as above. Acne remains an issue, hesitant to try prescription topicals.     - PAP w/ HPV testing performed today per routine guidelines  - cont PNV  - routine PNL's today  - 1st TM screening--declines at present, will let me know if she changes her mind, aware of 11-13 wk time frame  - consider Azelex for acne in future   - quant B-HCG today    - anticipate routine PNC, reviewed s/sx miscarriage today  - discussed anxiety--gave printed resources from  [facility] website re: anxiety and meditation. Will monitor.     Education: Discussed above plan with patient and her  who state their understanding.     RTC: 1 mo and prn--will likely be co-managing w/ Dr. Fitzgerald.    Samantha Curiel MD         [phone]

## 2025-06-26 NOTE — CONSULT NOTE ADULT - ASSESSMENT
Mild R hemiparesis (RUE and RLE)    Subtle expressive aphasia and dysarthria.    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      Cognitive impairment (I suspect it is unrelated to the acute infarct).           Mild R hemiparesis (RUE and RLE)    Subtle expressive aphasia and dysarthria.    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    This is yet another example of the failure of the NIHSS as a diagnostic tool or as a substitute for a proper neurologic examination.  The NIHSS assesses limb motor function only by crude evaluation for drift. It is possible to loose 90% of strength and have no drift according to the NIHSS.    Cognitive impairment (I suspect it is unrelated to the acute infarct).        RECOMMENDATIONS    ASA 81mg daily indefinitely    Clopidogrel 75mg daily.  Depending on results of work-up, may continue clopidogrel only until 7/16/2025.    If work-up discloses an indication for anticoagulation for stroke prevention, and the decision is made to anticoagulate, then antiplatelet agents are to be D/C'ed if being given solely for secondary stroke prevention.    Cardiac telemetry.    TTE.    ESR, CRP, fasting lipid profile, RPR, B12, SERUM folic acid, methylmalonic acid+homocysteine, TSH,                                                            IMPORTANT  -  PLEASE NOTE:                              I am a neurohospitalist. I do not see patients outside of the hospital.        Patients requiring neurological follow-up after discharge may contact one of the following offices.     [facility] [place]  [address] [place] [address]  [phone]    [facility]  [address].  [place], [place]  [phone]    Irma Anne M.D.   - Department of Neurology  [employer] at [facility]                      Mild R hemiparesis (RUE and RLE)    Subtle expressive aphasia and dysarthria.    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    This is yet another example of the failure of the NIHSS as a diagnostic tool or as a substitute for a proper neurologic examination.  The NIHSS assesses limb motor function only by crude evaluation for drift. It is possible to loose 90% of strength and have no drift according to the NIHSS.    Cognitive impairment (I suspect it is unrelated to the acute infarct).        RECOMMENDATIONS    ASA 81mg daily indefinitely    Clopidogrel 75mg daily.  Depending on results of work-up, may continue clopidogrel only until 7/16/2025.    If work-up discloses an indication for anticoagulation for stroke prevention, and the decision is made to anticoagulate, then antiplatelet agents are to be D/C'ed if being given solely for secondary stroke prevention.    Cardiac telemetry.    TTE.    ESR, CRP, fasting lipid profile, RPR, B12, SERUM folic acid, methylmalonic acid+homocysteine, TSH, FT4, 25-OH vitamin D, SPEP.                                                           IMPORTANT  -  PLEASE NOTE:                              I am a neurohospitalist. I do not see patients outside of the hospital.        Patients requiring neurological follow-up after discharge may contact one of the following offices.     [facility]  [address] [place] [address]  [phone]    [place] [address].  [place], [place]  [phone]    Irma Anne M.D.   - Department of Neurology  [employer] at [facility]                      Mild R hemiparesis (RUE and RLE)    Subtle expressive aphasia and dysarthria.    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    This is yet another example of the failure of the NIHSS as a diagnostic tool or as a substitute for a proper neurologic examination.  The NIHSS assesses limb motor function only by crude evaluation for drift. It is possible to loose 90% of strength and have no drift according to the NIHSS.    Cognitive impairment (I suspect it is unrelated to the acute infarct).  Possible early dementia.        RECOMMENDATIONS    ASA 81mg daily indefinitely    Clopidogrel 75mg daily.  Depending on results of work-up, may continue clopidogrel only until 7/16/2025.    If work-up discloses an indication for anticoagulation for stroke prevention, and the decision is made to anticoagulate, then antiplatelet agents are to be D/C'ed if being given solely for secondary stroke prevention.    Cardiac telemetry.    TTE.    ESR, CRP, fasting lipid profile, RPR, B12, SERUM folic acid, methylmalonic acid+homocysteine, TSH, FT4, 25-OH vitamin D, SPEP.    Ambulatory heart rhythm monitoring if no significant rhytm disturbance detected in hospital.                                                           IMPORTANT  -  PLEASE NOTE:                              I am a neurohospitalist. I do not see patients outside of the hospital.        Patients requiring neurological follow-up after discharge may contact one of the following offices.     [address] [place] [address] [phone]    [facility]  [address].  [place], [place]  [phone]    Irma Anne M.D.   - Department of Neurology  [employer] at [facility]                      Mild R hemiparesis (RUE and RLE)    Subtle expressive aphasia and dysarthria.    Clinical picture consistent with L MCA territory infarct.      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    This is yet another example of the failure of the NIHSS as a diagnostic tool or as a substitute for a proper neurologic examination.  The NIHSS assesses limb motor function only by crude evaluation for drift. It is possible to loose 90% of strength and have no drift according to the NIHSS.    Cognitive impairment (I suspect it is unrelated to the acute infarct).  Possible early dementia.    Wandering off at 11:00 PM to go to his doctor's office more likely reflects an underlying cognitive disorder than an acute stroke.       Strong suspicion is of acute ischemic stroke AND concomitant early dementia.        RECOMMENDATIONS    ASA 81mg daily indefinitely (final decision depends on results of work-up).      Clopidogrel 75mg daily.  Depending on results of work-up, may continue clopidogrel only until 7/16/2025.    If work-up discloses an indication for anticoagulation for stroke prevention, and the decision is made to anticoagulate, then antiplatelet agents are to be D/C'ed if being given solely for secondary stroke prevention.    Cardiac telemetry.    TTE.    ESR, CRP, fasting lipid profile, RPR, B12, SERUM folic acid, methylmalonic acid+homocysteine, TSH, FT4, 25-OH vitamin D, SPEP.    Ambulatory heart rhythm monitoring if no significant rhytm disturbance detected in hospital.                                                           IMPORTANT  -  PLEASE NOTE:                              I am a neurohospitalist. I do not see patients outside of the hospital.        Patients requiring neurological follow-up after discharge may contact one of the following offices.     [place] [id number].  [place], [place] [id number] [phone]    [place] [address].  [place], [place]  [phone]    Irma Anne M.D.   - Department of Neurology  [employer] at [facility]
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
## 2025-06-26 NOTE — ED ADULT NURSE NOTE - NSFALLHARMRISKINTERV_ED_ALL_ED
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
## 2025-06-26 NOTE — ED PROVIDER NOTE - CARE PLAN
Principal Discharge DX:	Confusion  Secondary Diagnosis:	SOB (shortness of breath)  Secondary Diagnosis:	Difficulty speaking  Secondary Diagnosis:	RBBB   1

## 2025-06-26 NOTE — PATIENT PROFILE ADULT - FALL HARM RISK - HARM RISK INTERVENTIONS
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
## 2025-06-26 NOTE — GOALS OF CARE CONVERSATION - ADVANCED CARE PLANNING - CONVERSATION DETAILS
Code status is full code. Would want chest compressions and intubation. Wants all life-sustaining measures. No limitations on care at this time. . Wants wife, Dian Palafox ([phone]) to make his medical decisions for him if he were to become unable to do so on his own.

## 2025-06-26 NOTE — H&P ADULT - NSICDXPASTMEDICALHX_GEN_ALL_CORE_FT
PAST MEDICAL HISTORY:  BPH (benign prostatic hyperplasia)     HLD (hyperlipidemia)     HTN (hypertension)     Hypothyroidism
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
## 2025-06-27 LAB
24R-OH-CALCIDIOL SERPL-MCNC: 30.9 NG/ML — SIGNIFICANT CHANGE UP
A1C WITH ESTIMATED AVERAGE GLUCOSE RESULT: 5.8 % — HIGH (ref 4–5.6)
CHOLEST SERPL-MCNC: 118 MG/DL — SIGNIFICANT CHANGE UP
CRP SERPL-MCNC: 12 MG/L — HIGH
ERYTHROCYTE [SEDIMENTATION RATE] IN BLOOD: 13 MM/HR — SIGNIFICANT CHANGE UP (ref 0–15)
ESTIMATED AVERAGE GLUCOSE: 120 MG/DL — HIGH (ref 68–114)
FOLATE RBC-MCNC: >2890.44 NG/ML — SIGNIFICANT CHANGE UP (ref 499–1504)
HCT VFR BLD CALC: 42.9 % — SIGNIFICANT CHANGE UP (ref 39–50)
HCYS SERPL-MCNC: 9.6 UMOL/L — SIGNIFICANT CHANGE UP
HDLC SERPL-MCNC: 34 MG/DL — LOW
LDLC SERPL-MCNC: 64 MG/DL — SIGNIFICANT CHANGE UP
LIPID PNL WITH DIRECT LDL SERPL: 64 MG/DL — SIGNIFICANT CHANGE UP
NONHDLC SERPL-MCNC: 84 MG/DL — SIGNIFICANT CHANGE UP
PROCALCITONIN SERPL-MCNC: 0.09 NG/ML — SIGNIFICANT CHANGE UP (ref 0.02–0.1)
PROT SERPL-MCNC: 6.4 G/DL — SIGNIFICANT CHANGE UP (ref 6–8.3)
T PALLIDUM AB TITR SER: NEGATIVE — SIGNIFICANT CHANGE UP
T4 FREE SERPL-MCNC: 1.8 NG/DL — SIGNIFICANT CHANGE UP (ref 0.9–1.8)
TRIGL SERPL-MCNC: 107 MG/DL — SIGNIFICANT CHANGE UP
TSH SERPL-MCNC: 0.69 UIU/ML — SIGNIFICANT CHANGE UP (ref 0.36–3.74)
VIT B12 SERPL-MCNC: 844 PG/ML — SIGNIFICANT CHANGE UP (ref 232–1245)

## 2025-06-27 PROCEDURE — 70551 MRI BRAIN STEM W/O DYE: CPT | Mod: 26

## 2025-06-27 PROCEDURE — 99233 SBSQ HOSP IP/OBS HIGH 50: CPT

## 2025-06-27 PROCEDURE — 99232 SBSQ HOSP IP/OBS MODERATE 35: CPT

## 2025-06-27 RX ORDER — DEXTROMETHORPHAN HBR, GUAIFENESIN 200 MG/10ML
200 LIQUID ORAL EVERY 6 HOURS
Refills: 0 | Status: DISCONTINUED | OUTPATIENT
Start: 2025-06-27 | End: 2025-07-01

## 2025-06-27 RX ADMIN — Medication 81 MILLIGRAM(S): at 12:15

## 2025-06-27 RX ADMIN — TAMSULOSIN HYDROCHLORIDE 0.4 MILLIGRAM(S): 0.4 CAPSULE ORAL at 22:23

## 2025-06-27 RX ADMIN — TAMSULOSIN HYDROCHLORIDE 0.4 MILLIGRAM(S): 0.4 CAPSULE ORAL at 00:02

## 2025-06-27 RX ADMIN — ESCITALOPRAM OXALATE 10 MILLIGRAM(S): 20 TABLET ORAL at 12:16

## 2025-06-27 RX ADMIN — CLOPIDOGREL BISULFATE 75 MILLIGRAM(S): 75 TABLET, FILM COATED ORAL at 12:16

## 2025-06-27 RX ADMIN — BUPROPION HYDROBROMIDE 150 MILLIGRAM(S): 522 TABLET, EXTENDED RELEASE ORAL at 12:16

## 2025-06-27 RX ADMIN — DEXTROMETHORPHAN HBR, GUAIFENESIN 200 MILLIGRAM(S): 200 LIQUID ORAL at 18:54

## 2025-06-27 RX ADMIN — Medication 100 MICROGRAM(S): at 06:26

## 2025-06-27 RX ADMIN — ATORVASTATIN CALCIUM 40 MILLIGRAM(S): 80 TABLET, FILM COATED ORAL at 22:22

## 2025-06-27 RX ADMIN — Medication 1 TABLET(S): at 12:17

## 2025-06-27 RX ADMIN — AMLODIPINE BESYLATE 10 MILLIGRAM(S): 10 TABLET ORAL at 06:26

## 2025-06-27 RX ADMIN — FINASTERIDE 5 MILLIGRAM(S): 1 TABLET, FILM COATED ORAL at 12:16

## 2025-06-27 RX ADMIN — Medication 40 MILLIGRAM(S): at 06:26

## 2025-06-27 NOTE — OCCUPATIONAL THERAPY INITIAL EVALUATION ADULT - LIVES WITH, PROFILE
in a private house with 6 step to enter with left ascending handrail , Once inside, pt has to negotiate a flight of stairs 13 steps , with right ascending handrail half ,  to access the bedroom and bathroom.  Pt has another 9 steps with  right descending handrail to get to his bedoom. The bathroom has a tub/shower combination, fixed shower head and standard toilet. . All information provided needs verification from family/spouse in a private house with 6 steps to enter with left ascending handrail.  Once inside, pt has to negotiate a flight of stairs 13 steps, with right ascending handrail half, walls on the next half, to access the bedroom and bathroom.  Pt has another 9 steps with  right descending handrail to get to his bedoom. The bathroom has a tub/shower combination, fixed shower head and standard toilet. . All information provided needs verification from family/spouse

## 2025-06-27 NOTE — OCCUPATIONAL THERAPY INITIAL EVALUATION ADULT - NSOTDISCHREC_GEN_A_CORE
To be determined pending further assessment/ PT evalualtion. Pt has the potential to discharged home with rehab services .

## 2025-06-27 NOTE — PROGRESS NOTE ADULT - ASSESSMENT
Mild R hemiparesis (RUE and RLE)    Subtle expressive aphasia and dysarthria.    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    This is yet another example of the failure of the NIHSS as a diagnostic tool or as a substitute for a proper neurologic examination.  The NIHSS assesses limb motor function only by crude evaluation for drift. It is possible to loose 90% of strength and have no drift according to the NIHSS.    Cognitive impairment (I suspect it is unrelated to the acute infarct).  Possible early dementia.   Mild R hemiparesis (RUE and RLE)    Subtle expressive aphasia and dysarthria.    Clinical picture consistent with L MCA territory infarct.      CT negative for acute lesion.    Suspected tiny acute L and R precentral gyral infarcts.     Negative NIHSS in a patient with an unquestionable R hemiparesis.      This is yet another example of the failure of the NIHSS as a diagnostic tool or as a substitute for a proper neurologic examination.  The NIHSS assesses limb motor function only by crude evaluation for drift. It is possible to loose 90% of strength and have no drift according to the NIHSS.    He may have more extensive infarction (L MCA territory) than seen on MR given the R hemiparesis and language disturbance.  He may even have had three infarcts, of which we now are seeing only two (10 to 40% of acute ischemic strokes are Ct- and MR-negative).       Cognitive impairment (I suspect it is unrelated to the acute infarct).  Possible early dementia.    Wandering off at 11:00 PM to go to his doctor's office more likely reflects an underlying cognitive disorder than an acute stroke.       Strong suspicion is of acute ischemic stroke AND concomitant early dementia.       Mild R hemiparesis (RUE and RLE)    Subtle expressive aphasia and dysarthria.    Clinical picture consistent with L MCA territory infarct.      CT negative for acute lesion.    Suspected tiny acute L and R precentral gyral infarcts.     Negative NIHSS in a patient with an unquestionable R hemiparesis.      This is yet another example of the failure of the NIHSS as a diagnostic tool or as a substitute for a proper neurologic examination.  The NIHSS assesses limb motor function only by crude evaluation for drift. It is possible to loose 90% of strength and have no drift according to the NIHSS.    He may have more extensive infarction (L MCA territory) than seen on MR given the R hemiparesis and language disturbance.  He may even have had three infarcts, of which we now are seeing only two, and only on MR (10 to 40% of acute ischemic strokes are CT- and MR-negative).      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       Cognitive impairment (I suspect it is unrelated to the acute infarct).  Possible early dementia.    Wandering off at 11:00 PM to go to his doctor's office more likely reflects an underlying cognitive disorder than an acute stroke.       Strong suspicion is of acute ischemic stroke AND concomitant early dementia.        RECOMMENDATIONS    Continue ASA 81mg and clopidogrel 75mg daily for now.      If work-up discloses an indication for anticoagulation for stroke prevention, and the decision is made to anticoagulate, then antiplatelet agents are to be D/C'ed if being given for secondary stroke prevention.    Follow-up on pending TTE.    Follow-up on pending lab tests results.      Please order SERUM folic acid (folate).      Also please order vitamin B6 and B1 levels, copper, zinc, ammonia.      Out-Patient stroke and cognitive neurology follow-ups.                                                         IMPORTANT  -  PLEASE NOTE:                              I am a neurohospitalist. I do not see patients outside of the hospital.        Patients requiring neurological follow-up after discharge may contact one of the following offices.     [facility]  [address] [place] [address].  [place] [address]  [phone]    [place] [address] [facility].  [place], [place]  [phone]      Irma Anne M.D.   - Department of Neurology  [employer] at [facility]        Mild R hemiparesis (RUE and RLE)    Subtle expressive aphasia and dysarthria.    Clinical picture consistent with L MCA territory infarct.      CT negative for acute lesion.    Suspected tiny acute L and R precentral gyral infarcts.     Negative NIHSS in a patient with an unquestionable R hemiparesis.      This is yet another example of the failure of the NIHSS as a diagnostic tool or as a substitute for a proper neurologic examination.  The NIHSS assesses limb motor function only by crude evaluation for drift. It is possible to loose 90% of strength and have no drift according to the NIHSS.    He may have more extensive infarction (L MCA territory) than seen on MR given the R hemiparesis and language disturbance.  He may even have had three infarcts, of which we now are seeing only two, and only on MR (10 to 40% of acute ischemic strokes are CT- and MR-negative).      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       Cognitive impairment (I suspect it is unrelated to the acute infarct).  Possible early dementia.    Wandering off at 11:00 PM to go to his doctor's office more likely reflects an underlying cognitive disorder than an acute stroke.       Strong suspicion is of acute ischemic stroke AND concomitant early dementia.        RECOMMENDATIONS    Continue ASA 81mg and clopidogrel 75mg daily for now.      If work-up discloses an indication for anticoagulation for stroke prevention, and the decision is made to anticoagulate, then antiplatelet agents are to be D/C'ed if being given for secondary stroke prevention.    Follow-up on pending TTE.    Follow-up on pending lab tests results.      Please order SERUM folate level.      Also please order vitamin B6 and B1 levels, copper, zinc, ammonia.      Out-Patient stroke and cognitive neurology follow-ups.                                                         IMPORTANT  -  PLEASE NOTE:                              I am a neurohospitalist. I do not see patients outside of the hospital.        Patients requiring neurological follow-up after discharge may contact one of the following offices.     [facility]  [address] [place] [address].  [place] [address]  [phone]    [address].  [place], [place]  [phone]      Irma Anne M.D.   - Department of Neurology  [employer] [facility] [employer] [facility] [employer] at [facility]

## 2025-06-27 NOTE — PROGRESS NOTE ADULT - SUBJECTIVE AND OBJECTIVE BOX
This is in follow-up to my initial Neurology Consult Note of yesterday.  Hx as detailed therein.     Per radiology report of non-con MR head:  "Parenchymal volume loss and chronic microvessel ischemic changes are   identified    There is evidence of a tiny foci of T2 prolongation with restricted   diffusion suspected involving the left precentral gyrus region (4-21)   with a a second area is suspected involving the right precentral gyrus   region. These findings could be compatible with small acute infarcts. No   hemorrhagic transformation is seen. No specific significant shift or   herniation seen.    The large vessels demonstrate normal flow voids    Minimal mucosal thickening is seen involving the inferior right maxillary   sinus    The patient is status post bilateral cataract surgery.    IMPRESSION: Tiny acute infarcts suspected as described above."      TTE was ordered; not yet performed.    Results of recommended lab tests:  ESR  13   CRP  12   fasting lipid profile  chol/trig/HDL/LDL  118/107/34/64    RPR  pending   B12  844   SERUM folic acid  - apparently not ordered  Instead RBC folate level was ordered - pending  methylmalonic acid  pending    homocysteine  9.6   TSH  0.693   FT4  1.8   25-OH vitamin D  30.9   SPEP  pattern pending      x This is in follow-up to my initial Neurology Consult Note of yesterday.  Hx as detailed therein.     Per radiology report of non-con MR head:  "Parenchymal volume loss and chronic microvessel ischemic changes are   identified    There is evidence of a tiny foci of T2 prolongation with restricted   diffusion suspected involving the left precentral gyrus region (4-21)   with a a second area is suspected involving the right precentral gyrus   region. These findings could be compatible with small acute infarcts. No   hemorrhagic transformation is seen. No specific significant shift or   herniation seen.    The large vessels demonstrate normal flow voids    Minimal mucosal thickening is seen involving the inferior right maxillary   sinus    The patient is status post bilateral cataract surgery.    IMPRESSION: Tiny acute infarcts suspected as described above."      TTE was ordered; not yet performed.    Results of recommended lab tests:  ESR  13   CRP  12   fasting lipid profile  chol/trig/HDL/LDL  118/107/34/64    RPR  pending   B12  844   SERUM folic acid  - apparently not ordered  Instead RBC folate level was ordered - pending  methylmalonic acid  pending    homocysteine  9.6   TSH  0.693   FT4  1.8   25-OH vitamin D  30.9   SPEP  pattern pending       This is in follow-up to my initial Neurology Consult Note of yesterday.  Hx as detailed therein.     Per radiology report of non-con MR head:  "Parenchymal volume loss and chronic microvessel ischemic changes are   identified    There is evidence of a tiny foci of T2 prolongation with restricted   diffusion suspected involving the left precentral gyrus region (4-21)   with a a second area is suspected involving the right precentral gyrus   region. These findings could be compatible with small acute infarcts. No   hemorrhagic transformation is seen. No specific significant shift or   herniation seen.    The large vessels demonstrate normal flow voids    Minimal mucosal thickening is seen involving the inferior right maxillary   sinus    The patient is status post bilateral cataract surgery.    IMPRESSION: Tiny acute infarcts suspected as described above."      TTE was ordered; not yet performed.    Results of recommended lab tests:  ESR  13   CRP  12   fasting lipid profile  chol/trig/HDL/LDL  118/107/34/64    RPR  pending   B12  844   SERUM folic acid  - apparently not ordered  Instead RBC folate level was ordered - pending  methylmalonic acid  pending    homocysteine  9.6   TSH  0.693   FT4  1.8   25-OH vitamin D  30.9   SPEP  pattern pending    Exam findings unchanged except speech barely stuttering now.  Cognitive dysfunction unchanged.  R hemiparesis unchanged.
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
## 2025-06-27 NOTE — OCCUPATIONAL THERAPY INITIAL EVALUATION ADULT - FINE MOTOR COORDINATION, RIGHT HAND, DIADOCHOKINESIS SKILLS, OT EVAL
Medication:   Requested Prescriptions     Pending Prescriptions Disp Refills    [employer] PEN NEEDLES 31G 31G X 8 MM MISC [Pharmacy Med Name: [facility] PEN NEEDLE 8MM X 31G]       Sig: USE TO INJECT INSULIN THREE TIMES A DAY       Last Filled:  11/14/2023     Patient Phone Number: [phone] (home)     Last appt: 3/21/2024   Next appt: 6/26/2024    Last Labs DM:   Lab Results   Component Value Date/Time    LABA1C 6.1 02/21/2024 10:52 AM         
normal performance

## 2025-06-27 NOTE — OCCUPATIONAL THERAPY INITIAL EVALUATION ADULT - GENERAL OBSERVATIONS, REHAB EVAL
Pt was seen for initial OT consult, encountered in bed on cardiac monitoring in NAD. Pt makes needs/wants known and speech is coherent. Pt integrates both sides of his body and all extremties without difficulties. Pt was AA&Ox3, anxious, impulsive at times, but  cooperative & followed commands. Pt denied numbness or tingling in BUE/BLE.  Pt presents with generalized weakness with cognitive limitations that impact ADL management and functional mobility.
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
## 2025-06-27 NOTE — OCCUPATIONAL THERAPY INITIAL EVALUATION ADULT - BALANCE TRAINING, PT EVAL
Pt will increased standing balance from f good minus  to good in 30 days to prevent falls, optimize pt's ability for ADL management & safely navigate in all terrains

## 2025-06-27 NOTE — OCCUPATIONAL THERAPY INITIAL EVALUATION ADULT - TRANSFER TRAINING, PT EVAL
Pt will transfer to all surfaces, safely and independently with the  least restrictive adaptive device within 2 weeks.

## 2025-06-27 NOTE — OCCUPATIONAL THERAPY INITIAL EVALUATION ADULT - ADDITIONAL COMMENTS
Prior to admission, pt was functioning in his roles, self sufficient & ambulating independently without any assistive devices. Presently pt needs assistance with lower body self care tasks due to generalized  weakness,. Pt is right hand dominant and wears glasses for reading. Upon evaluation, .Pt performed bed mobility and lower body dressing  wit supervision. Pt engaged in functional transfers and ambulated 25 ft x2 to bathroom with supervision for toileting transfers. Pt has normal muscle tone , good balance, muscle strength and coordination.  No loss of balance was noted. Pt exhibited fair depth perception, figure ground, spatial relation and safely navigated around barriers in his surroundings with 1-2 verbal cues. Dexterity skills with reciprocal hand use are intact bilaterally

## 2025-06-27 NOTE — OCCUPATIONAL THERAPY INITIAL EVALUATION ADULT - SHARP/DULL DISCRIMINATION, RLE, REHAB EVAL
Treating Ankle Sprains  Treatment will depend on how bad your sprain is. For a severe sprain, healing may take 3 months or more. Right after your injury: Use R.I.C.E.  · Rest: At first, keep weight off the ankle as much as you can.  You may be given crut within normal limits

## 2025-06-28 LAB
CULTURE RESULTS: SIGNIFICANT CHANGE UP
SPECIMEN SOURCE: SIGNIFICANT CHANGE UP

## 2025-06-28 PROCEDURE — 99232 SBSQ HOSP IP/OBS MODERATE 35: CPT

## 2025-06-28 RX ORDER — ENOXAPARIN SODIUM 100 MG/ML
40 INJECTION SUBCUTANEOUS EVERY 24 HOURS
Refills: 0 | Status: DISCONTINUED | OUTPATIENT
Start: 2025-06-28 | End: 2025-07-02

## 2025-06-28 RX ADMIN — Medication 40 MILLIGRAM(S): at 06:17

## 2025-06-28 RX ADMIN — Medication 1 TABLET(S): at 11:56

## 2025-06-28 RX ADMIN — Medication 81 MILLIGRAM(S): at 11:55

## 2025-06-28 RX ADMIN — BUPROPION HYDROBROMIDE 150 MILLIGRAM(S): 522 TABLET, EXTENDED RELEASE ORAL at 11:55

## 2025-06-28 RX ADMIN — FINASTERIDE 5 MILLIGRAM(S): 1 TABLET, FILM COATED ORAL at 11:56

## 2025-06-28 RX ADMIN — TAMSULOSIN HYDROCHLORIDE 0.4 MILLIGRAM(S): 0.4 CAPSULE ORAL at 21:41

## 2025-06-28 RX ADMIN — ESCITALOPRAM OXALATE 10 MILLIGRAM(S): 20 TABLET ORAL at 11:56

## 2025-06-28 RX ADMIN — AMLODIPINE BESYLATE 10 MILLIGRAM(S): 10 TABLET ORAL at 06:18

## 2025-06-28 RX ADMIN — CLOPIDOGREL BISULFATE 75 MILLIGRAM(S): 75 TABLET, FILM COATED ORAL at 11:55

## 2025-06-28 RX ADMIN — ATORVASTATIN CALCIUM 40 MILLIGRAM(S): 80 TABLET, FILM COATED ORAL at 21:41

## 2025-06-28 RX ADMIN — Medication 100 MICROGRAM(S): at 06:17

## 2025-06-28 RX ADMIN — ENOXAPARIN SODIUM 40 MILLIGRAM(S): 100 INJECTION SUBCUTANEOUS at 17:37

## 2025-06-28 NOTE — PHYSICAL THERAPY INITIAL EVALUATION ADULT - BALANCE TRAINING, PT EVAL
Pt will improve static & dynamic standing balance to Good using  a cane to perform  Gait independently in 2 weeks

## 2025-06-28 NOTE — PHYSICAL THERAPY INITIAL EVALUATION ADULT - PERTINENT HX OF CURRENT PROBLEM, REHAB EVAL
As per hospitalist notes "85 year old male with PMHx of HTN, HLD, hypothyroidism, and BPH who presented to the ED on 6/26/25 for complaints of confusion and slurred speech and admitted for suspected CVA".    MRI: Acute right precentral gyrus small infarcts. "

## 2025-06-28 NOTE — PHYSICAL THERAPY INITIAL EVALUATION ADULT - TRANSFER TRAINING, PT EVAL
Pt will be able to perform sit to stand, stand pivot transfer using  a cane  independently in 2 to 3 days to improve functional transfers between any 2 surfaces

## 2025-06-28 NOTE — PHYSICAL THERAPY INITIAL EVALUATION ADULT - PLANNED THERAPY INTERVENTIONS, PT EVAL
pt will be able to negotiate 1 flight of stairs with left rail, another flight with right rail up independently in 2 weeks/balance training/gait training/strengthening/transfer training

## 2025-06-28 NOTE — PROGRESS NOTE ADULT - ASSESSMENT
Mason Palafox is an 85 year old male with PMHx of HTN, HLD, hypothyroidism, and BPH who presented to the ED on 6/26/25 for complaints of confusion and slurred speech and admitted for suspected CVA.    Acute right precentral gyrus small infarcts. symptoms better.   S/p  mg PO and clopidogrel 75 mg PO in the ED  Neuro checks q4, DAPT with ASA 81 mg and clopidogrel 75 mg started as per neuro recs  cont current dose of lipitor.   cont tele.   ERNIE per PT> pending placement.   Reviewed neurology consult recs.   ECHO pending     Cough, unclear etiology  Reports dry cough that started a few days ago, has been taking OTC promethazine  D-dimer 798 and pro-BNP WNL on admission  No fevers, chills, recent travel, or known sick contacts  CTA chest without PE but with patent central airways, reticular scarring/atelectasis dependent lung bases, RML and lingular segment, possible small focus of distal airway impaction R. lung base  Mucinex started.     Asymptomatic bacteriuria  Denies dysuria or malodorous urine  States he always has urinary frequency but attributes that to BPH  U/A with trace blood, large leuks, negative nitrites, WBC TNTC, RBC 5, many bacteria  S/p ceftriaxone 1 g IV in the ED  Will hold off on additional antibiotics for now      Chronic medical conditions:  HTN: PTA amlodipine 10 mg  HLD: PTA atorvastatin 10 mg reordered as high intensity for now  Hypothyroidism: PTA levothyroxine 100 mcg  BPH: PTA dutasteride 0.5 mg reordered as finasteride 5 mg, tamsulosin 0.4 mg    FC  DVT ppx: Lovenox  Time spent by me managing the patient including but not limited to reviewing the chart, discussion with the IDR team (nurse//care manager/ACP), physical exam and assessment and plan is 36 mins

## 2025-06-28 NOTE — PROGRESS NOTE ADULT - ASSESSMENT
Mild R hemiparesis (RUE and RLE)    Subtle expressive aphasia and dysarthria.    Clinical picture consistent with L MCA territory infarct.      CT negative for acute lesion.    Suspected tiny acute L and R precentral gyral infarcts on MR.     Negative NIHSS in a patient with an unquestionable R hemiparesis.      This is yet another example of the failure of the NIHSS as a diagnostic tool or as a substitute for a proper neurologic examination.  The NIHSS assesses limb motor function only by crude evaluation for drift. It is possible to loose 90% of strength and have no drift according to the NIHSS.    He may have more extensive infarction (L MCA territory) than seen on MR given the R hemiparesis and language disturbance.  He may even have had three infarcts, of which we now are seeing only two, and only on MR (10 to 40% of acute ischemic strokes are CT- and MR-negative).      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       Cognitive impairment (I suspect it is unrelated to the acute infarct).  Possible early dementia.    Wandering off at 11:00 PM to go to his doctor's office more likely reflects an underlying cognitive disorder than an acute stroke.       Strong suspicion is of acute ischemic stroke AND concomitant early dementia.        RECOMMENDATIONS    Continue ASA 81mg and clopidogrel 75mg daily for now.      If work-up discloses an indication for anticoagulation for stroke prevention, and the decision is made to anticoagulate, then antiplatelet agents are to be D/C'ed if being given for secondary stroke prevention.    Follow-up on pending TTE.    Follow-up on pending lab tests results.      Please order SERUM folate level, vitamin B6 and B1 levels, copper, zinc, ammonia.      Out-Patient stroke and cognitive neurology follow-ups.                                                         IMPORTANT  -  PLEASE NOTE:                              I am a neurohospitalist. I do not see patients outside of the hospital.        Patients requiring neurological follow-up after discharge may contact one of the following offices.     [facility]  [address] [place] [address]  [phone]    [address]  [place], [place]  [phone]    Irma Anne M.D.   - Department of Neurology  [employer] [facility] [employer] [facility] at [facility]     10

## 2025-06-28 NOTE — PHYSICAL THERAPY INITIAL EVALUATION ADULT - GAIT DEVIATIONS NOTED, PT EVAL
decreased harsh/increased time in double stance/decreased step length/decreased stride length/decreased weight-shifting ability

## 2025-06-28 NOTE — PROGRESS NOTE ADULT - SUBJECTIVE AND OBJECTIVE BOX
CHIEF COMPLAINT: Slurred speech has improved.   no new focal weakness  no chest pain or sob or fever  no witnessed aspiration or choking.     PHYSICAL EXAM:    GENERAL: Moderately built, no acute distress   CHEST/LUNG:  No wheezing, no crackles   HEART: Regular rate and rhythm; +SM  ABDOMEN: Soft, Nontender, Nondistended; Bowel sounds present  EXTREMITIES:  No clubbing, cyanosis, or edema  Psychiatry: Alert, awake and in good mood.      OBJECTIVE DATA:   Vital Signs Last 24 Hrs  T(C): 36.4 (2025 10:41), Max: 37.1 (2025 23:59)  T(F): 97.6 (2025 10:41), Max: 98.8 (2025 23:59)  HR: 91 (2025 10:41) (81 - 91)  BP: 148/76 (2025 10:41) (123/62 - 148/76)  BP(mean): --  RR: 18 (2025 10:41) (18 - 18)  SpO2: 95% (2025 10:41) (94% - 95%)    Parameters below as of 2025 10:41  Patient On (Oxygen Delivery Method): room air      LABS:                        x      x     )-----------( x        ( 2025 08:16 )             42.9             06-26    140  |  107  |  13  ----------------------------<  73  3.9   |  29  |  1.28    Ca    8.4[L]      2025 13:55    TPro  6.4  /  Alb  x   /  TBili  x   /  DBili  x   /  AST  x   /  ALT  x   /  AlkPhos  x   -                Urinalysis Basic - ( 2025 16:50 )    Color: Yellow / Appearance: Cloudy / S.016 / pH: x  Gluc: x / Ketone: x  / Bili: Negative / Urobili: 0.2 mg/dL   Blood: x / Protein: Negative mg/dL / Nitrite: Negative   Leuk Esterase: Large / RBC: 5 /HPF / WBC Too Numerous to count /HPF   Sq Epi: x / Non Sq Epi: x / Bacteria: Many /HPF            CAPILLARY BLOOD GLUCOSE      POCT Blood Glucose.: 99 mg/dL (2025 12:39)  Interval Radiology studies: reviewed by me  < from: MR Head No Cont (25 @ 16:57) >  right precentral gyrus small acute infarcts    < end of copied text >      MEDICATIONS  (STANDING):  amLODIPine   Tablet 10 milliGRAM(s) Oral daily  aspirin  chewable 81 milliGRAM(s) Oral daily  atorvastatin 40 milliGRAM(s) Oral at bedtime  buPROPion XL (24-Hour) . 150 milliGRAM(s) Oral daily  clopidogrel Tablet 75 milliGRAM(s) Oral daily  escitalopram 10 milliGRAM(s) Oral daily  finasteride 5 milliGRAM(s) Oral daily  levothyroxine 100 MICROGram(s) Oral daily  multivitamin 1 Tablet(s) Oral daily  pantoprazole    Tablet 40 milliGRAM(s) Oral before breakfast  senna 1 Tablet(s) Oral daily  tamsulosin 0.4 milliGRAM(s) Oral at bedtime    MEDICATIONS  (PRN):  acetaminophen     Tablet .. 650 milliGRAM(s) Oral every 6 hours PRN Temp greater or equal to 38C (100.4F), Mild Pain (1 - 3)  aluminum hydroxide/magnesium hydroxide/simethicone Suspension 30 milliLiter(s) Oral every 4 hours PRN Dyspepsia  guaiFENesin Oral Liquid (Sugar-Free) 200 milliGRAM(s) Oral every 6 hours PRN Cough  melatonin 3 milliGRAM(s) Oral at bedtime PRN Insomnia  ondansetron Injectable 4 milliGRAM(s) IV Push every 8 hours PRN Nausea and/or Vomiting

## 2025-06-28 NOTE — PHYSICAL THERAPY INITIAL EVALUATION ADULT - IMPAIRED TRANSFERS: SIT/STAND, REHAB EVAL
lef tsided trunk lean was observed/impaired balance/impaired coordination/decreased flexibility/impaired postural control/decreased strength

## 2025-06-28 NOTE — PHYSICAL THERAPY INITIAL EVALUATION ADULT - MODALITIES TREATMENT COMMENTS
Romberg's test positive, incoordination(ataxia), left sided trunk lean, unsteadiness was observed during gait

## 2025-06-28 NOTE — PROGRESS NOTE ADULT - SUBJECTIVE AND OBJECTIVE BOX
This is in follow-up to yesterday's Neurology Progress Note; initially seen in neurologic consultation 6/26/25; Hx as detailed therein.    As previously noted, per radiology report of non-con MR head:  "Parenchymal volume loss and chronic microvessel ischemic changes are   identified    There is evidence of a tiny foci of T2 prolongation with restricted   diffusion suspected involving the left precentral gyrus region (4-21)   with a a second area is suspected involving the right precentral gyrus   region. These findings could be compatible with small acute infarcts. No   hemorrhagic transformation is seen. No specific significant shift or   herniation seen.    The large vessels demonstrate normal flow voids    Minimal mucosal thickening is seen involving the inferior right maxillary   sinus    The patient is status post bilateral cataract surgery.    IMPRESSION: Tiny acute infarcts suspected as described above."      TTE was ordered; not yet performed.    Results of recommended lab tests:  ESR  13   CRP  12   fasting lipid profile  chol/trig/HDL/LDL  118/107/34/64    RPR  neg   B12  844   SERUM folic acid  - apparently not ordered  Instead RBC folate level was ordered - result >02281.44  methylmalonic acid  pending    homocysteine  9.6   TSH  0.693   FT4  1.8   25-OH vitamin D  30.9   SPEP  pattern pending This is in follow-up to yesterday's Neurology Progress Note; initially seen in neurologic consultation 6/26/25; Hx as detailed therein.    As previously noted, per radiology report of non-con MR head:  "Parenchymal volume loss and chronic microvessel ischemic changes are   identified    There is evidence of a tiny foci of T2 prolongation with restricted   diffusion suspected involving the left precentral gyrus region (4-21)   with a a second area is suspected involving the right precentral gyrus   region. These findings could be compatible with small acute infarcts. No   hemorrhagic transformation is seen. No specific significant shift or   herniation seen.    The large vessels demonstrate normal flow voids    Minimal mucosal thickening is seen involving the inferior right maxillary   sinus    The patient is status post bilateral cataract surgery.    IMPRESSION: Tiny acute infarcts suspected as described above."      TTE was ordered; not yet performed.    Results of recommended lab tests:  ESR  13   CRP  12   fasting lipid profile  chol/trig/HDL/LDL  118/107/34/64    RPR  neg   B12  844   SERUM folic acid  - apparently not ordered  Instead RBC folate level was ordered - result >68926.44  methylmalonic acid  pending    homocysteine  9.6   TSH  0.693   FT4  1.8   25-OH vitamin D  30.9   SPEP  pattern pending  B6 level  apparently not ordered  B1 level  apparently not ordered  copper  apparently not ordered  zinc apparently not ordered  ammonia apparently not ordered   This is in follow-up to yesterday's Neurology Progress Note; initially seen in neurologic consultation 6/26/25; Hx as detailed therein.    As previously noted, per radiology report of non-con MR head:  "Parenchymal volume loss and chronic microvessel ischemic changes are   identified    There is evidence of a tiny foci of T2 prolongation with restricted   diffusion suspected involving the left precentral gyrus region (4-21)   with a a second area is suspected involving the right precentral gyrus   region. These findings could be compatible with small acute infarcts. No   hemorrhagic transformation is seen. No specific significant shift or   herniation seen.    The large vessels demonstrate normal flow voids    Minimal mucosal thickening is seen involving the inferior right maxillary   sinus    The patient is status post bilateral cataract surgery.    IMPRESSION: Tiny acute infarcts suspected as described above."      TTE was ordered; not yet performed.    Results of recommended lab tests:  ESR  13   CRP  12   fasting lipid profile  chol/trig/HDL/LDL  118/107/34/64    RPR  neg   B12  844   SERUM folic acid  - apparently not ordered  Instead RBC folate level was ordered - result >58489.44  methylmalonic acid  pending    homocysteine  9.6   TSH  0.693   FT4  1.8   25-OH vitamin D  30.9   SPEP  pattern pending  B6 level  apparently not ordered  B1 level  apparently not ordered  copper  apparently not ordered  zinc apparently not ordered  ammonia apparently not ordered    EXAMINATION    Awake, alert.  Mild psychomotor slowing; somewhat hesitant responses (unchanged).  Minimally stuttered delivery.  7x9= "16."  On being reminded to multiply, not add, he says "26."  4x5 = "20."  Change from $1.00 for 47 cent purchase = "36 . . .   no, it's not 36  . . .   8 cents."   Names 3 fingers.  President = "Trump."  Previous president - after a delay utters a paraphasic nonsensical substitute for Biden, then does say Biden.  RUE/RLE weakness unchanged from 6/26/25.

## 2025-06-28 NOTE — PHYSICAL THERAPY INITIAL EVALUATION ADULT - GAIT TRAINING, PT EVAL
Pt will be able to ambulate 350 feet using a cane  independently in 2 weeks to improve functional mobility

## 2025-06-28 NOTE — PHYSICAL THERAPY INITIAL EVALUATION ADULT - ADDITIONAL COMMENTS
As per spouse at bed side, pt resides with her in a house with 4 steps at side entry with left rail up, another 2 steps with left rail up to enter into the house, then 7 steps with right rail up to the main level, then 1 more flight of stairs with right rail up to his bed room, he has tub shower, no grab bars, std toilet with vanity nearby. he was independent in all functional mobility using no AD, PTA

## 2025-06-29 LAB
% ALBUMIN: 57.7 % — SIGNIFICANT CHANGE UP
% ALPHA 1: 5.2 % — SIGNIFICANT CHANGE UP
% ALPHA 2: 14.1 % — SIGNIFICANT CHANGE UP
% BETA: 11 % — SIGNIFICANT CHANGE UP
% GAMMA: 12 % — SIGNIFICANT CHANGE UP
ALBUMIN SERPL ELPH-MCNC: 3.7 G/DL — SIGNIFICANT CHANGE UP (ref 3.6–5.5)
ALBUMIN/GLOB SERPL ELPH: 1.4 RATIO — SIGNIFICANT CHANGE UP
ALPHA1 GLOB SERPL ELPH-MCNC: 0.3 G/DL — SIGNIFICANT CHANGE UP (ref 0.1–0.4)
ALPHA2 GLOB SERPL ELPH-MCNC: 0.9 G/DL — SIGNIFICANT CHANGE UP (ref 0.5–1)
B-GLOBULIN SERPL ELPH-MCNC: 0.7 G/DL — SIGNIFICANT CHANGE UP (ref 0.5–1)
GAMMA GLOBULIN: 0.8 G/DL — SIGNIFICANT CHANGE UP (ref 0.6–1.6)
PROT PATTERN SERPL ELPH-IMP: SIGNIFICANT CHANGE UP
PROT SERPL-MCNC: 6.4 G/DL — SIGNIFICANT CHANGE UP (ref 6–8.3)

## 2025-06-29 PROCEDURE — 99233 SBSQ HOSP IP/OBS HIGH 50: CPT

## 2025-06-29 PROCEDURE — 99232 SBSQ HOSP IP/OBS MODERATE 35: CPT

## 2025-06-29 PROCEDURE — 99418 PROLNG IP/OBS E/M EA 15 MIN: CPT

## 2025-06-29 PROCEDURE — 93306 TTE W/DOPPLER COMPLETE: CPT | Mod: 26

## 2025-06-29 RX ADMIN — Medication 1 TABLET(S): at 11:48

## 2025-06-29 RX ADMIN — CLOPIDOGREL BISULFATE 75 MILLIGRAM(S): 75 TABLET, FILM COATED ORAL at 11:48

## 2025-06-29 RX ADMIN — ATORVASTATIN CALCIUM 40 MILLIGRAM(S): 80 TABLET, FILM COATED ORAL at 21:15

## 2025-06-29 RX ADMIN — FINASTERIDE 5 MILLIGRAM(S): 1 TABLET, FILM COATED ORAL at 11:48

## 2025-06-29 RX ADMIN — ENOXAPARIN SODIUM 40 MILLIGRAM(S): 100 INJECTION SUBCUTANEOUS at 17:32

## 2025-06-29 RX ADMIN — BUPROPION HYDROBROMIDE 150 MILLIGRAM(S): 522 TABLET, EXTENDED RELEASE ORAL at 11:48

## 2025-06-29 RX ADMIN — Medication 40 MILLIGRAM(S): at 05:36

## 2025-06-29 RX ADMIN — TAMSULOSIN HYDROCHLORIDE 0.4 MILLIGRAM(S): 0.4 CAPSULE ORAL at 21:16

## 2025-06-29 RX ADMIN — AMLODIPINE BESYLATE 10 MILLIGRAM(S): 10 TABLET ORAL at 05:37

## 2025-06-29 RX ADMIN — ESCITALOPRAM OXALATE 10 MILLIGRAM(S): 20 TABLET ORAL at 11:48

## 2025-06-29 RX ADMIN — DEXTROMETHORPHAN HBR, GUAIFENESIN 200 MILLIGRAM(S): 200 LIQUID ORAL at 15:13

## 2025-06-29 RX ADMIN — Medication 100 MICROGRAM(S): at 05:36

## 2025-06-29 RX ADMIN — Medication 81 MILLIGRAM(S): at 11:48

## 2025-06-29 NOTE — PROGRESS NOTE ADULT - ASSESSMENT
Mild R hemiparesis (RUE and RLE)    Subtle expressive aphasia and dysarthria.    Clinical picture consistent with L MCA territory infarct.      CT negative for acute lesion.    Suspected tiny acute L and R precentral gyral infarcts on MR.     Negative NIHSS in a patient with an unquestionable R hemiparesis.      This is yet another example of the failure of the NIHSS as a diagnostic tool or as a substitute for a proper neurologic examination.  The NIHSS assesses limb motor function only by crude evaluation for drift. It is possible to loose 90% of strength and have no drift according to the NIHSS.    He may have more extensive infarction (L MCA territory) than seen on MR given the R hemiparesis and language disturbance.  He may even have had three infarcts, of which we now are seeing only two, and only on MR (10 to 40% of acute ischemic strokes are CT- and MR-negative).      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       Cognitive impairment (I suspect it is unrelated to the acute infarct).  Possible early dementia.    Wandering off at 11:00 PM to go to his doctor's office more likely reflects an underlying cognitive disorder than an acute stroke.       Strong suspicion is of acute ischemic stroke AND concomitant early dementia.     Mild R hemiparesis (RUE and RLE)    Subtle expressive aphasia and dysarthria.    Clinical picture consistent with L MCA territory infarct.        CT negative for acute lesion.    Suspected tiny acute L and R precentral gyral infarcts on MR.     Negative NIHSS in a patient with an unquestionable R hemiparesis.      This is yet another example of the failure of the NIHSS as a diagnostic tool or as a substitute for a proper neurologic examination.  The NIHSS assesses limb motor function only by crude evaluation for drift. It is possible to loose 90% of strength and have no drift according to the NIHSS.    He may have more extensive infarction (L MCA territory) than seen on MR given the R hemiparesis and language disturbance.  He may even have had three infarcts, of which we now are seeing only two, and only on MR (10 to 40% of acute ischemic strokes are CT- and MR-negative).      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       Cognitive impairment (I suspect it is unrelated to the acute infarct).  Possible early dementia.    Wandering off at 11:00 PM to go to his doctor's office more likely reflects an underlying cognitive disorder than an acute stroke.       Strong suspicion is of acute ischemic stroke AND concomitant early dementia.     Mild R hemiparesis (RUE and RLE)    Subtle expressive aphasia and dysarthria.    Clinical picture consistent with L MCA territory infarct.      CT negative for acute lesion.    Suspected tiny acute L and R precentral gyral infarcts on MR.     Negative NIHSS in a patient with an unquestionable R hemiparesis.      This is yet another example of the failure of the NIHSS as a diagnostic tool or as a substitute for a proper neurologic examination.  The NIHSS assesses limb motor function only by crude evaluation for drift. It is possible to loose 90% of strength and have no drift according to the NIHSS.    He may have more extensive infarction (L MCA territory) than seen on MR given the R hemiparesis and language disturbance.  He may even have had three infarcts, of which we now are seeing only two, and only on MR (10 to 40% of acute ischemic strokes are CT- and MR-negative).      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       Cognitive impairment (probably unrelated to the acute infarct).  Suspicious for early-stage dementia.    Wandering off at 11:00 PM to go to his doctor's office more likely reflects an underlying cognitive disorder than an acute stroke.       Strong suspicion is of acute ischemic stroke AND concomitant early dementia.        RECOMMENDATIONS    Awaiting TTE.    Serum folate level, B6 and B1 levels, copper, zinc, ammonia  -  I have ordered these.      Continue ASA 81mg and clopidogrel 75mg daily for now.      If work-up discloses an indication for anticoagulation for stroke prevention, and the decision is made to anticoagulate, then antiplatelet agents are to be D/C'ed if being given for secondary stroke prevention.    Follow-up on pending TTE.    Follow-up on pending lab tests results.      Out-Pt vascular neurology and cognitive neurology follow-ups.                                                             IMPORTANT  -  PLEASE NOTE:                              I am a neurohospitalist. I do not see patients outside of the hospital.        Patients requiring neurological follow-up after discharge may contact one of the following offices.     [facility]  [address]  [place], [place] [address] [phone]    [place] [address]  [place], [place]  [phone]    Irma Anne M.D.   - Department of Neurology  [employer] [facility] [employer] [facility]

## 2025-06-29 NOTE — PROGRESS NOTE ADULT - SUBJECTIVE AND OBJECTIVE BOX
To be completed.    This is in follow-up to yesterday's Neurology Progress Note.     Results of recommended lab tests:  ESR  13   CRP  12   fasting lipid profile  chol/trig/HDL/LDL  118/107/34/64    RPR  neg   B12  844   SERUM folic acid  - apparently not ordered  Instead RBC folate level was ordered - result >70484.44  methylmalonic acid  pending    homocysteine  9.6   TSH  0.693   FT4  1.8   25-OH vitamin D  30.9   SPEP  normal pattern    TTE was ordered; to be performed.        This is in follow-up to yesterday's Neurology Progress Note.     Results of recommended lab tests:  ESR  13   CRP  12   fasting lipid profile  chol/trig/HDL/LDL  118/107/34/64    RPR  neg   B12  844   SERUM folic acid  - apparently not ordered  Instead RBC folate level was ordered - result >16275.44  methylmalonic acid  pending    homocysteine  9.6   TSH  0.693   FT4  1.8   25-OH vitamin D  30.9   SPEP  normal pattern    TTE was ordered; to be performed.      Discussion held at length with Pt's wife and brother-in-law.  They have noted he has for some time had memory impairment.  They wondered if he might have dementia.         This is in follow-up to yesterday's Neurology Progress Note.     Results of recommended lab tests:  ESR  13   CRP  12   fasting lipid profile  chol/trig/HDL/LDL  118/107/34/64    RPR  neg   B12  844   SERUM folic acid  - apparently not ordered  Instead RBC folate level was ordered - result >94249.44  methylmalonic acid  pending    homocysteine  9.6   TSH  0.693   FT4  1.8   25-OH vitamin D  30.9   SPEP  normal pattern  B6 level  apparently not ordered  B1 level  apparently not ordered  copper  apparently not ordered  zinc apparently not ordered  ammonia apparently not ordered    TTE was ordered; to be performed.      Discussion held at length with Pt's wife and brother-in-law.  They have noted he has for some time had memory impairment.  They wondered if he might have dementia.         This is in follow-up to yesterday's Neurology Progress Note.     Results of recommended lab tests:  ESR  13   CRP  12   fasting lipid profile  chol/trig/HDL/LDL  118/107/34/64    RPR  neg   B12  844   SERUM folic acid  - apparently not ordered  Instead RBC folate level was ordered - result >66384.44  methylmalonic acid  pending    homocysteine  9.6   TSH  0.693   FT4  1.8   25-OH vitamin D  30.9   SPEP  normal pattern  B6 level  apparently not ordered  B1 level  apparently not ordered  copper  apparently not ordered  zinc apparently not ordered  ammonia apparently not ordered    TTE was ordered; to be performed.      EXAMINATION    Speech hesitancy unchanged.  Nickels in a dollar = "20."  37 cent purchase  --> 67 cents change from $1.00 (first time he calculated change correctly).  9x7 = "26" (strangely repeatedly identical incorrect response).         Unchanged mild R hemiparesis.  No UE drift by 3 methods.  No LE drift.        Discussion held at length with Pt's wife and brother-in-law.  They have noted he has for some time had memory impairment.  They wondered if he might have dementia.   We discussed stroke-related vs dementia-related findings, work-up, recommendations for out-Pt follow-up.

## 2025-06-29 NOTE — PROGRESS NOTE ADULT - TIME BILLING
The necessity of the time spent during the encounter on this date of service was due to:   - Ordering, reviewing, and interpreting labs, testing, and imaging.  - Independently obtaining a review of systems and performing a physical exam  - Reviewing prior hospitalization and where necessary, outpatient records.  - Reviewing consultant recommendations/communicating with consultants  - Counselling and educating patient and family regarding interpretation of aforementioned items and plan of care.    Time-based billing (NON-critical care). Total minutes spent: 52
Family discussion.  Redemonstration of persistent deficits.

## 2025-06-29 NOTE — PROGRESS NOTE ADULT - ASSESSMENT
Mason Palafox is an 85 year old male with PMHx of HTN, HLD, hypothyroidism, and BPH who presented to the ED on 6/26/25 for complaints of confusion and slurred speech and admitted for suspected CVA.    Acute right precentral gyrus small infarcts. symptoms better.   S/p  mg PO and clopidogrel 75 mg PO in the ED  Neuro checks q4, DAPT with ASA 81 mg and clopidogrel 75 mg started as per neuro recs  cont current dose of lipitor.   cont tele.   ERNIE per PT> pending placement.   Reviewed neurology consult recs.   ECHO done but report is pending.     Cough, unclear etiology  Reports dry cough that started a few days ago, has been taking OTC promethazine  D-dimer 798 and pro-BNP WNL on admission  No fevers, chills, recent travel, or known sick contacts  CTA chest without PE but with patent central airways, reticular scarring/atelectasis dependent lung bases, RML and lingular segment, possible small focus of distal airway impaction R. lung base  Mucinex started.     Asymptomatic bacteriuria  Denies dysuria or malodorous urine  States he always has urinary frequency but attributes that to BPH  U/A with trace blood, large leuks, negative nitrites, WBC TNTC, RBC 5, many bacteria  S/p ceftriaxone 1 g IV in the ED  Will hold off on additional antibiotics for now      Chronic medical conditions:  HTN: PTA amlodipine 10 mg  HLD: PTA atorvastatin 10 mg reordered as high intensity for now  Hypothyroidism: PTA levothyroxine 100 mcg  BPH: PTA dutasteride 0.5 mg reordered as finasteride 5 mg, tamsulosin 0.4 mg    FC  DVT ppx: Lovenox  Time spent by me managing the patient including but not limited to reviewing the chart, discussion with the IDR team (nurse//care manager/ACP), physical exam and assessment and plan is 35 mins

## 2025-06-29 NOTE — PROGRESS NOTE ADULT - SUBJECTIVE AND OBJECTIVE BOX
CHIEF COMPLAINT: Slurred speech has improved.   no new focal weakness  no chest pain or sob or fever    PHYSICAL EXAM:    GENERAL: Moderately built, no acute distress   CHEST/LUNG:  No wheezing, no crackles   HEART: Regular rate and rhythm; +SM  ABDOMEN: Soft, Nontender, Nondistended; Bowel sounds present  EXTREMITIES:  No clubbing, cyanosis, or edema  Psychiatry: Alert, awake and in good mood.      OBJECTIVE DATA:     Vital Signs Last 24 Hrs  T(C): 36.3 (29 Jun 2025 08:00), Max: 37.3 (29 Jun 2025 00:19)  T(F): 97.3 (29 Jun 2025 08:00), Max: 99.2 (29 Jun 2025 00:19)  HR: 79 (29 Jun 2025 08:00) (71 - 90)  BP: 129/73 (29 Jun 2025 08:00) (112/68 - 129/73)  BP(mean): --  RR: 18 (29 Jun 2025 08:00) (17 - 18)  SpO2: 94% (29 Jun 2025 08:00) (91% - 98%)    Parameters below as of 29 Jun 2025 08:00  Patient On (Oxygen Delivery Method): room air    Culture - Urine (collected 06-26)  Source: Clean Catch  Final Report (06-28):    <10,000 CFU/mL Normal Urogenital Tarsha      MEDICATIONS  (STANDING):  amLODIPine   Tablet 10 milliGRAM(s) Oral daily  aspirin  chewable 81 milliGRAM(s) Oral daily  atorvastatin 40 milliGRAM(s) Oral at bedtime  buPROPion XL (24-Hour) . 150 milliGRAM(s) Oral daily  clopidogrel Tablet 75 milliGRAM(s) Oral daily  enoxaparin Injectable 40 milliGRAM(s) SubCutaneous every 24 hours  escitalopram 10 milliGRAM(s) Oral daily  finasteride 5 milliGRAM(s) Oral daily  levothyroxine 100 MICROGram(s) Oral daily  multivitamin 1 Tablet(s) Oral daily  pantoprazole    Tablet 40 milliGRAM(s) Oral before breakfast  senna 1 Tablet(s) Oral daily  tamsulosin 0.4 milliGRAM(s) Oral at bedtime    MEDICATIONS  (PRN):  acetaminophen     Tablet .. 650 milliGRAM(s) Oral every 6 hours PRN Temp greater or equal to 38C (100.4F), Mild Pain (1 - 3)  aluminum hydroxide/magnesium hydroxide/simethicone Suspension 30 milliLiter(s) Oral every 4 hours PRN Dyspepsia  guaiFENesin Oral Liquid (Sugar-Free) 200 milliGRAM(s) Oral every 6 hours PRN Cough  melatonin 3 milliGRAM(s) Oral at bedtime PRN Insomnia  ondansetron Injectable 4 milliGRAM(s) IV Push every 8 hours PRN Nausea and/or Vomiting

## 2025-06-30 ENCOUNTER — TRANSCRIPTION ENCOUNTER (OUTPATIENT)
Age: 85
End: 2025-06-30

## 2025-06-30 LAB
AMMONIA BLD-MCNC: 39 UMOL/L — HIGH (ref 11–32)
FOLATE SERPL-MCNC: >20 NG/ML — SIGNIFICANT CHANGE UP

## 2025-06-30 PROCEDURE — 99232 SBSQ HOSP IP/OBS MODERATE 35: CPT

## 2025-06-30 RX ADMIN — ENOXAPARIN SODIUM 40 MILLIGRAM(S): 100 INJECTION SUBCUTANEOUS at 17:59

## 2025-06-30 RX ADMIN — Medication 40 MILLIGRAM(S): at 05:32

## 2025-06-30 RX ADMIN — ATORVASTATIN CALCIUM 40 MILLIGRAM(S): 80 TABLET, FILM COATED ORAL at 21:28

## 2025-06-30 RX ADMIN — AMLODIPINE BESYLATE 10 MILLIGRAM(S): 10 TABLET ORAL at 05:31

## 2025-06-30 RX ADMIN — Medication 81 MILLIGRAM(S): at 11:32

## 2025-06-30 RX ADMIN — CLOPIDOGREL BISULFATE 75 MILLIGRAM(S): 75 TABLET, FILM COATED ORAL at 11:33

## 2025-06-30 RX ADMIN — Medication 100 MICROGRAM(S): at 05:31

## 2025-06-30 RX ADMIN — ESCITALOPRAM OXALATE 10 MILLIGRAM(S): 20 TABLET ORAL at 11:33

## 2025-06-30 RX ADMIN — Medication 1 TABLET(S): at 11:32

## 2025-06-30 RX ADMIN — TAMSULOSIN HYDROCHLORIDE 0.4 MILLIGRAM(S): 0.4 CAPSULE ORAL at 21:28

## 2025-06-30 RX ADMIN — FINASTERIDE 5 MILLIGRAM(S): 1 TABLET, FILM COATED ORAL at 11:32

## 2025-06-30 RX ADMIN — BUPROPION HYDROBROMIDE 150 MILLIGRAM(S): 522 TABLET, EXTENDED RELEASE ORAL at 11:33

## 2025-06-30 NOTE — DISCHARGE NOTE NURSING/CASE MANAGEMENT/SOCIAL WORK - FINANCIAL ASSISTANCE
[facility] provides services at a reduced cost to those who are determined to be eligible through [facility]’s financial assistance program. Information regarding [facility]’s financial assistance program can be found by going to [email or web address] or by calling [phone].

## 2025-06-30 NOTE — DISCHARGE NOTE NURSING/CASE MANAGEMENT/SOCIAL WORK - PATIENT PORTAL LINK FT
You can access the FollowMyHealth Patient Portal offered by [facility] by registering at the following website: [email or web address]. By joining [facility]’s FollowMyHealth portal, you will also be able to view your health information using other applications (apps) compatible with our system.

## 2025-06-30 NOTE — DISCHARGE NOTE NURSING/CASE MANAGEMENT/SOCIAL WORK - NSDCPEFALRISK_GEN_ALL_CORE
For information on Fall & Injury Prevention, visit: [email or web address] OR  [email or web address] OR  https://www.cdc.gov/steadi/patient.html

## 2025-06-30 NOTE — PROGRESS NOTE ADULT - ASSESSMENT
Mason Palafox is an 85 year old male with PMHx of HTN, HLD, hypothyroidism, and BPH who presented to the ED on 6/26/25 for complaints of confusion and slurred speech and admitted for suspected CVA.    Acute right precentral gyrus small infarcts.   S/p  mg PO and clopidogrel 75 mg PO in the ED  Neuro checks q4, DAPT with ASA 81 mg and clopidogrel 75 mg started as per neuro recs  cont current dose of lipitor.   cont tele.   ERNIE per PT> pending placement.   Reviewed neurology consult recs.   ECHO done but report is pending.     Cough, unclear etiology  Reports dry cough that started a few days ago, has been taking OTC promethazine  D-dimer 798 and pro-BNP WNL on admission  No fevers, chills, recent travel, or known sick contacts  CTA chest without PE but with patent central airways, reticular scarring/atelectasis dependent lung bases, RML and lingular segment, possible small focus of distal airway impaction R. lung base  Mucinex started.     Asymptomatic bacteriuria  Denies dysuria or malodorous urine  States he always has urinary frequency but attributes that to BPH  U/A with trace blood, large leuks, negative nitrites, WBC TNTC, RBC 5, many bacteria  S/p ceftriaxone 1 g IV in the ED  Will hold off on additional antibiotics for now      Chronic medical conditions:  HTN: PTA amlodipine 10 mg  HLD: PTA atorvastatin 10 mg reordered as high intensity for now  Hypothyroidism: PTA levothyroxine 100 mcg  BPH: PTA dutasteride 0.5 mg reordered as finasteride 5 mg, tamsulosin 0.4 mg    FC  DVT ppx: Lovenox  Time spent by me managing the patient including but not limited to reviewing the chart, discussion with the IDR team (nurse//care manager/ACP), physical exam and assessment and plan is 35 mins      Mason Palafox is an 85 year old male with PMHx of HTN, HLD, hypothyroidism, and BPH who presented to the ED on 6/26/25 for complaints of confusion and slurred speech and admitted for suspected CVA.    Acute CVA   CT head w/ no acute findings   MRI brain showed right precentral gyrus small infarcts.   TTE w/ EF 58 %, no regional wall motion abnormalities, mild (grade 1) left ventricular diastolic dysfunction, normal right ventricular systolic function, negative for intracardiac shunt. Mild valvular disease   Telemetry monitoring   Continue ASA and Plavix   Continue Lipitor    ERNIE per PT> patient refused   Neuro f/up     Elevated d-dimer  D-dimer 798 and pro-BNP WNL on admission  CTA chest without PE but with patent central airways, reticular scarring/atelectasis dependent lung bases, RML and lingular segment, possible small focus of distal airway impaction R. lung base  Mucinex started.     Asymptomatic bacteriuria  Denies dysuria or malodorous urine  States he always has urinary frequency but attributes that to BPH  U/A with trace blood, large leuks, negative nitrites, WBC TNTC, RBC 5, many bacteria  S/p ceftriaxone 1 g IV in the ED  Will hold off on additional antibiotics for now    Chronic medical conditions:  HTN: PTA amlodipine 10 mg  HLD: PTA atorvastatin 10 mg reordered as high intensity for now  Hypothyroidism: PTA levothyroxine 100 mcg  BPH: PTA dutasteride 0.5 mg reordered as finasteride 5 mg, tamsulosin 0.4 mg    DVT ppx: Lovenox  DC planning to rehab

## 2025-06-30 NOTE — PROGRESS NOTE ADULT - SUBJECTIVE AND OBJECTIVE BOX
PROGRESS NOTE:     Patient is a 85y old  Male who presents with a chief complaint of Suspected CVA (29 Jun 2025 18:07)      SUBJECTIVE / OVERNIGHT EVENTS: no acute events.     ADDITIONAL REVIEW OF SYSTEMS:    MEDICATIONS  (STANDING):  amLODIPine   Tablet 10 milliGRAM(s) Oral daily  aspirin  chewable 81 milliGRAM(s) Oral daily  atorvastatin 40 milliGRAM(s) Oral at bedtime  buPROPion XL (24-Hour) . 150 milliGRAM(s) Oral daily  clopidogrel Tablet 75 milliGRAM(s) Oral daily  enoxaparin Injectable 40 milliGRAM(s) SubCutaneous every 24 hours  escitalopram 10 milliGRAM(s) Oral daily  finasteride 5 milliGRAM(s) Oral daily  levothyroxine 100 MICROGram(s) Oral daily  multivitamin 1 Tablet(s) Oral daily  pantoprazole    Tablet 40 milliGRAM(s) Oral before breakfast  senna 1 Tablet(s) Oral daily  tamsulosin 0.4 milliGRAM(s) Oral at bedtime    MEDICATIONS  (PRN):  acetaminophen     Tablet .. 650 milliGRAM(s) Oral every 6 hours PRN Temp greater or equal to 38C (100.4F), Mild Pain (1 - 3)  aluminum hydroxide/magnesium hydroxide/simethicone Suspension 30 milliLiter(s) Oral every 4 hours PRN Dyspepsia  guaiFENesin Oral Liquid (Sugar-Free) 200 milliGRAM(s) Oral every 6 hours PRN Cough  melatonin 3 milliGRAM(s) Oral at bedtime PRN Insomnia  ondansetron Injectable 4 milliGRAM(s) IV Push every 8 hours PRN Nausea and/or Vomiting      CAPILLARY BLOOD GLUCOSE        I&O's Summary    29 Jun 2025 07:01  -  30 Jun 2025 07:00  --------------------------------------------------------  IN: 0 mL / OUT: 700 mL / NET: -700 mL    30 Jun 2025 07:01  -  30 Jun 2025 15:15  --------------------------------------------------------  IN: 840 mL / OUT: 0 mL / NET: 840 mL        PHYSICAL EXAM:  Vital Signs Last 24 Hrs  T(C): 36.7 (30 Jun 2025 12:48), Max: 36.9 (29 Jun 2025 20:00)  T(F): 98.1 (30 Jun 2025 12:48), Max: 98.4 (29 Jun 2025 20:00)  HR: 84 (30 Jun 2025 12:48) (71 - 89)  BP: 119/58 (30 Jun 2025 12:48) (115/70 - 132/76)  BP(mean): --  RR: 18 (30 Jun 2025 12:48) (18 - 20)  SpO2: 93% (30 Jun 2025 12:48) (92% - 94%)    Parameters below as of 30 Jun 2025 12:48  Patient On (Oxygen Delivery Method): room air        CONSTITUTIONAL: NAD, well-developed  RESPIRATORY: Normal respiratory effort; lungs are clear to auscultation bilaterally  CARDIOVASCULAR: Regular rate and rhythm, normal S1 and S2, +SM; No lower extremity edema; Peripheral pulses are 2+ bilaterally  ABDOMEN: Nontender to palpation, normoactive bowel sounds, no rebound/guarding; No hepatosplenomegaly  MUSCLOSKELETAL: no clubbing or cyanosis of digits; no joint swelling or tenderness to palpation  PSYCH: A+O to person, place, and time; affect appropriate  NEURO: Mild R sided weakness     LABS:                      RADIOLOGY & ADDITIONAL TESTS:  Results Reviewed:   Imaging Personally Reviewed:  Electrocardiogram Personally Reviewed:    COORDINATION OF CARE:  Care Discussed with Consultants/Other Providers [Y/N]:  Prior or Outpatient Records Reviewed [Y/N]:

## 2025-07-01 PROCEDURE — 99232 SBSQ HOSP IP/OBS MODERATE 35: CPT

## 2025-07-01 RX ORDER — DEXTROMETHORPHAN HBR, GUAIFENESIN 200 MG/10ML
200 LIQUID ORAL EVERY 6 HOURS
Refills: 0 | Status: DISCONTINUED | OUTPATIENT
Start: 2025-07-01 | End: 2025-07-02

## 2025-07-01 RX ADMIN — FINASTERIDE 5 MILLIGRAM(S): 1 TABLET, FILM COATED ORAL at 12:05

## 2025-07-01 RX ADMIN — CLOPIDOGREL BISULFATE 75 MILLIGRAM(S): 75 TABLET, FILM COATED ORAL at 12:07

## 2025-07-01 RX ADMIN — Medication 81 MILLIGRAM(S): at 12:05

## 2025-07-01 RX ADMIN — DEXTROMETHORPHAN HBR, GUAIFENESIN 200 MILLIGRAM(S): 200 LIQUID ORAL at 13:20

## 2025-07-01 RX ADMIN — ENOXAPARIN SODIUM 40 MILLIGRAM(S): 100 INJECTION SUBCUTANEOUS at 18:38

## 2025-07-01 RX ADMIN — BUPROPION HYDROBROMIDE 150 MILLIGRAM(S): 522 TABLET, EXTENDED RELEASE ORAL at 12:07

## 2025-07-01 RX ADMIN — Medication 1 TABLET(S): at 12:05

## 2025-07-01 RX ADMIN — Medication 100 MICROGRAM(S): at 05:35

## 2025-07-01 RX ADMIN — AMLODIPINE BESYLATE 10 MILLIGRAM(S): 10 TABLET ORAL at 05:36

## 2025-07-01 RX ADMIN — Medication 1 TABLET(S): at 12:06

## 2025-07-01 RX ADMIN — Medication 40 MILLIGRAM(S): at 06:20

## 2025-07-01 RX ADMIN — TAMSULOSIN HYDROCHLORIDE 0.4 MILLIGRAM(S): 0.4 CAPSULE ORAL at 21:10

## 2025-07-01 RX ADMIN — ESCITALOPRAM OXALATE 10 MILLIGRAM(S): 20 TABLET ORAL at 12:07

## 2025-07-01 RX ADMIN — ATORVASTATIN CALCIUM 40 MILLIGRAM(S): 80 TABLET, FILM COATED ORAL at 21:10

## 2025-07-01 NOTE — PROGRESS NOTE ADULT - SUBJECTIVE AND OBJECTIVE BOX
PROGRESS NOTE:     Patient is a 85y old  Male who presents with a chief complaint of Suspected CVA (30 Jun 2025 15:15)      SUBJECTIVE / OVERNIGHT EVENTS: no acute events.     ADDITIONAL REVIEW OF SYSTEMS:    MEDICATIONS  (STANDING):  amLODIPine   Tablet 10 milliGRAM(s) Oral daily  aspirin  chewable 81 milliGRAM(s) Oral daily  atorvastatin 40 milliGRAM(s) Oral at bedtime  buPROPion XL (24-Hour) . 150 milliGRAM(s) Oral daily  clopidogrel Tablet 75 milliGRAM(s) Oral daily  enoxaparin Injectable 40 milliGRAM(s) SubCutaneous every 24 hours  escitalopram 10 milliGRAM(s) Oral daily  finasteride 5 milliGRAM(s) Oral daily  levothyroxine 100 MICROGram(s) Oral daily  multivitamin 1 Tablet(s) Oral daily  pantoprazole    Tablet 40 milliGRAM(s) Oral before breakfast  senna 1 Tablet(s) Oral daily  tamsulosin 0.4 milliGRAM(s) Oral at bedtime    MEDICATIONS  (PRN):  acetaminophen     Tablet .. 650 milliGRAM(s) Oral every 6 hours PRN Temp greater or equal to 38C (100.4F), Mild Pain (1 - 3)  aluminum hydroxide/magnesium hydroxide/simethicone Suspension 30 milliLiter(s) Oral every 4 hours PRN Dyspepsia  guaiFENesin Oral Liquid (Sugar-Free) 200 milliGRAM(s) Oral every 6 hours PRN Cough  melatonin 3 milliGRAM(s) Oral at bedtime PRN Insomnia  ondansetron Injectable 4 milliGRAM(s) IV Push every 8 hours PRN Nausea and/or Vomiting      CAPILLARY BLOOD GLUCOSE        I&O's Summary    30 Jun 2025 07:01  -  01 Jul 2025 07:00  --------------------------------------------------------  IN: 840 mL / OUT: 500 mL / NET: 340 mL        PHYSICAL EXAM:  Vital Signs Last 24 Hrs  T(C): 36.7 (01 Jul 2025 08:06), Max: 36.7 (01 Jul 2025 00:00)  T(F): 98 (01 Jul 2025 08:06), Max: 98 (01 Jul 2025 00:00)  HR: 93 (01 Jul 2025 12:00) (66 - 93)  BP: 120/70 (01 Jul 2025 12:00) (115/68 - 138/70)  BP(mean): --  RR: 20 (01 Jul 2025 12:00) (16 - 20)  SpO2: 92% (01 Jul 2025 12:00) (92% - 97%)    Parameters below as of 01 Jul 2025 08:06  Patient On (Oxygen Delivery Method): room air      CONSTITUTIONAL: NAD, well-developed  RESPIRATORY: Normal respiratory effort; lungs are clear to auscultation bilaterally  CARDIOVASCULAR: Regular rate and rhythm, normal S1 and S2, +SM; No lower extremity edema; Peripheral pulses are 2+ bilaterally  ABDOMEN: Nontender to palpation, normoactive bowel sounds, no rebound/guarding; No hepatosplenomegaly  MUSCLOSKELETAL: no clubbing or cyanosis of digits; no joint swelling or tenderness to palpation  PSYCH: A+O to person, place, and time; affect appropriate  NEURO: Mild R sided weakness     LABS:                      RADIOLOGY & ADDITIONAL TESTS:  Results Reviewed:   Imaging Personally Reviewed:  Electrocardiogram Personally Reviewed:    COORDINATION OF CARE:  Care Discussed with Consultants/Other Providers [Y/N]:  Prior or Outpatient Records Reviewed [Y/N]:

## 2025-07-01 NOTE — PROGRESS NOTE ADULT - REASON FOR ADMISSION
Suspected CVA

## 2025-07-01 NOTE — PROGRESS NOTE ADULT - ASSESSMENT
Mason Palafox is an 85 year old male with PMHx of HTN, HLD, hypothyroidism, and BPH who presented to the ED on 6/26/25 for complaints of confusion and slurred speech and admitted for suspected CVA.    Acute CVA   CT head w/ no acute findings   MRI brain showed right precentral gyrus small infarcts.   TTE w/ EF 58 %, no regional wall motion abnormalities, mild (grade 1) left ventricular diastolic dysfunction, normal right ventricular systolic function, negative for intracardiac shunt. Mild valvular disease   Telemetry monitoring   Continue ASA and Plavix   Continue Lipitor    ERNIE per PT  Neuro f/up     Elevated d-dimer  D-dimer 798 and pro-BNP WNL on admission  CTA chest without PE but with patent central airways, reticular scarring/atelectasis dependent lung bases, RML and lingular segment, possible small focus of distal airway impaction R. lung base  Mucinex started.     Asymptomatic bacteriuria  Denies dysuria or malodorous urine  States he always has urinary frequency but attributes that to BPH  U/A with trace blood, large leuks, negative nitrites, WBC TNTC, RBC 5, many bacteria  S/p ceftriaxone 1 g IV in the ED  Will hold off on additional antibiotics for now    Chronic medical conditions:  HTN: PTA amlodipine 10 mg  HLD: PTA atorvastatin 10 mg reordered as high intensity for now  Hypothyroidism: PTA levothyroxine 100 mcg  BPH: PTA dutasteride 0.5 mg reordered as finasteride 5 mg, tamsulosin 0.4 mg    DVT ppx: Lovenox  DC planning to rehab

## 2025-07-02 ENCOUNTER — TRANSCRIPTION ENCOUNTER (OUTPATIENT)
Age: 85
End: 2025-07-02

## 2025-07-02 VITALS — WEIGHT: 158.29 LBS

## 2025-07-02 LAB
COPPER SERPL-MCNC: 114 UG/DL — SIGNIFICANT CHANGE UP (ref 69–132)
METHYLMALONATE SERPL-SCNC: 152 NMOL/L — SIGNIFICANT CHANGE UP (ref 0–378)
ZINC SERPL-MCNC: 68 UG/DL — SIGNIFICANT CHANGE UP (ref 44–115)

## 2025-07-02 PROCEDURE — 99239 HOSP IP/OBS DSCHRG MGMT >30: CPT

## 2025-07-02 RX ORDER — CLOPIDOGREL BISULFATE 75 MG/1
1 TABLET, FILM COATED ORAL
Qty: 0 | Refills: 0 | DISCHARGE
Start: 2025-07-02

## 2025-07-02 RX ORDER — ASPIRIN 325 MG
1 TABLET ORAL
Qty: 0 | Refills: 0 | DISCHARGE
Start: 2025-07-02

## 2025-07-02 RX ORDER — ATORVASTATIN CALCIUM 80 MG/1
1 TABLET, FILM COATED ORAL
Refills: 0 | DISCHARGE

## 2025-07-02 RX ORDER — DEXTROMETHORPHAN HBR, GUAIFENESIN 200 MG/10ML
10 LIQUID ORAL
Qty: 0 | Refills: 0 | DISCHARGE
Start: 2025-07-02

## 2025-07-02 RX ORDER — ATORVASTATIN CALCIUM 80 MG/1
1 TABLET, FILM COATED ORAL
Qty: 0 | Refills: 0 | DISCHARGE
Start: 2025-07-02

## 2025-07-02 RX ADMIN — Medication 1 TABLET(S): at 12:38

## 2025-07-02 RX ADMIN — Medication 40 MILLIGRAM(S): at 06:11

## 2025-07-02 RX ADMIN — ESCITALOPRAM OXALATE 10 MILLIGRAM(S): 20 TABLET ORAL at 12:39

## 2025-07-02 RX ADMIN — AMLODIPINE BESYLATE 10 MILLIGRAM(S): 10 TABLET ORAL at 06:11

## 2025-07-02 RX ADMIN — Medication 81 MILLIGRAM(S): at 12:38

## 2025-07-02 RX ADMIN — Medication 100 MICROGRAM(S): at 05:29

## 2025-07-02 RX ADMIN — Medication 1 TABLET(S): at 12:39

## 2025-07-02 RX ADMIN — CLOPIDOGREL BISULFATE 75 MILLIGRAM(S): 75 TABLET, FILM COATED ORAL at 12:38

## 2025-07-02 RX ADMIN — FINASTERIDE 5 MILLIGRAM(S): 1 TABLET, FILM COATED ORAL at 12:38

## 2025-07-02 RX ADMIN — BUPROPION HYDROBROMIDE 150 MILLIGRAM(S): 522 TABLET, EXTENDED RELEASE ORAL at 12:38

## 2025-07-02 NOTE — DIETITIAN INITIAL EVALUATION ADULT - OTHER INFO
Pt reports eating well, finds the Ensures okay but drinking them, denies following any sort of diet at home and likely going to a rehab for continued therapy services. Pt reports typically consumes 1 good meal a day, mainly dinner and may snack sometimes. Pt reports walks 5 blocks to this [employer] shop and drinks a few cups of coffee. The rest of the day he spends in the basement of his home watching TV and his wife watches TV upstairs. Pt reports doesn't want wife to cook more than 1 meal a day due to left hand weakness. Pt denied any recent weight changes and weight has been stable at 155 lbs. Pt reports used to smoke 1 pack a day and stopped smoking at age 45 and told by doctor he had a mild stroke. Pt agreeable to continuing with Ensures and no other request at this time. Encouraged pt to have simple, quick and nutrient dense snacks around like pieces of fruit, nuts & seeds, yogurt, making smoothies, PB&J sandwiches ect that require very little kitchen preparation and cooking. Pt verbalized understanding.

## 2025-07-02 NOTE — DISCHARGE NOTE PROVIDER - NSDCFUSCHEDAPPT_GEN_ALL_CORE_FT
[facility]  VASCULAR [address]   Scheduled Appointment: 08/04/2025    Angelia Banks  [facility]  VASCULAR [address]   Scheduled Appointment: 08/04/2025    [facility] [address]   Scheduled Appointment: 09/19/2025

## 2025-07-02 NOTE — DISCHARGE NOTE PROVIDER - NSDCCPCAREPLAN_GEN_ALL_CORE_FT
PRINCIPAL DISCHARGE DIAGNOSIS  Diagnosis: Stroke  Assessment and Plan of Treatment: Acute strokes, likely embolic. Neurology recommends Aspirin and Plavix for now. Continue increased dose of Lipitor. Patient will need outpatient follow up with neurology and cardiology for internal loop recorder placement.      SECONDARY DISCHARGE DIAGNOSES  Diagnosis: SOB (shortness of breath)  Assessment and Plan of Treatment: CTA chest showed atelectasis and airway impaction in right lung base. Continue Mucinex.    Diagnosis: HTN (hypertension)  Assessment and Plan of Treatment: Continue amlodipine.

## 2025-07-02 NOTE — DISCHARGE NOTE PROVIDER - ATTENDING DISCHARGE PHYSICAL EXAMINATION:
CONSTITUTIONAL: NAD, well-developed  RESPIRATORY: Normal respiratory effort; lungs are clear to auscultation bilaterally  CARDIOVASCULAR: Regular rate and rhythm, normal S1 and S2, +SM; No lower extremity edema; Peripheral pulses are 2+ bilaterally  ABDOMEN: Nontender to palpation, normoactive bowel sounds, no rebound/guarding; No hepatosplenomegaly  MUSCLOSKELETAL: no clubbing or cyanosis of digits; no joint swelling or tenderness to palpation  PSYCH: A+O to person, place, and time; affect appropriate  NEURO: Mild R sided weakness

## 2025-07-02 NOTE — DISCHARGE NOTE PROVIDER - NSDCMRMEDTOKEN_GEN_ALL_CORE_FT
amLODIPine 10 mg oral tablet: 1 tab(s) orally once a day  atorvastatin 10 mg oral tablet: 1 tab(s) orally once a day  Budeprion  mg/24 hours oral tablet, extended release: 1 tab(s) orally once a day  dutasteride 0.5 mg oral capsule: 1 cap(s) orally once a day  escitalopram 10 mg oral tablet: 1 tab(s) orally once a day  levothyroxine 100 mcg (0.1 mg) oral tablet: 1 tab(s) orally once a day  Multiple Vitamins oral tablet: 1 tab(s) orally once a day  pantoprazole 40 mg oral delayed release tablet: 1 tab(s) orally once a day  senna (sennosides) 8.6 mg oral tablet: 1 tab(s) orally once a day  tamsulosin 0.4 mg oral capsule: 1 cap(s) orally once a day

## 2025-07-02 NOTE — DISCHARGE NOTE PROVIDER - HOSPITAL COURSE
85 year old male with PMHx of HTN, HLD, hypothyroidism, and BPH who presented to the ED on 6/26/25 for complaints of confusion and slurred speech and admitted for CVA.    Acute CVA, likely embolic   CT head w/ no acute findings   MRI brain showed right precentral gyrus small infarcts.   TTE w/ EF 58 %, no regional wall motion abnormalities, mild (grade 1) left ventricular diastolic dysfunction, normal right ventricular systolic function, negative for intracardiac shunt. Mild valvular disease   Monitored on telemetry, no arrhythmias    Outpatient ILR   Continue ASA and Plavix   Continue Lipitor 40mg qhs   ERNIE per PT  Neuro f/up     Elevated d-dimer  D-dimer 798 and pro-BNP WNL on admission  CTA chest without PE but with patent central airways, reticular scarring/atelectasis dependent lung bases, RML and lingular segment, possible small focus of distal airway impaction R. lung base  Mucinex started.     Asymptomatic bacteriuria  Denies dysuria or malodorous urine  States he always has urinary frequency but attributes that to BPH  U/A with trace blood, large leuks, negative nitrites, WBC TNTC, RBC 5, many bacteria  S/p ceftriaxone 1 g IV in the ED  Will hold off on additional antibiotics for now    Chronic medical conditions:  HTN: PTA amlodipine 10 mg  HLD: PTA atorvastatin 10 mg reordered as high intensity for now  Hypothyroidism: PTA levothyroxine 100 mcg  BPH: PTA dutasteride 0.5 mg reordered as finasteride 5 mg, tamsulosin 0.4 mg

## 2025-07-02 NOTE — CHART NOTE - NSCHARTNOTEFT_GEN_A_CORE
Neurology Chart Note:    Follow up for Dr Anne from last week's consult.  MRI brain shows 2 acute/subacute ischemic infarcts in left precentral gyrus and right precentral gyrus, suggestive of an embolic etiology.  If no known embolic source, recommend cardiology consult to consider ILR to monitor for paroxysmal Afib.  Notified attending hospitalist.    Shaun Sy MD  Neurology Attending Physician

## 2025-07-02 NOTE — DIETITIAN INITIAL EVALUATION ADULT - NS FNS DIET ORDER
Diet, Regular:   DASH/TLC {Sodium & Cholesterol Restricted}  Supplement Feeding Modality:  Oral  Ensure Enlive Cans or Servings Per Day:  1       Frequency:  Two Times a day (06-30-25 @ 13:36) [Active]

## 2025-07-02 NOTE — DIETITIAN INITIAL EVALUATION ADULT - PERTINENT MEDS FT
MEDICATIONS  (STANDING):  amLODIPine   Tablet 10 milliGRAM(s) Oral daily  aspirin  chewable 81 milliGRAM(s) Oral daily  atorvastatin 40 milliGRAM(s) Oral at bedtime  buPROPion XL (24-Hour) . 150 milliGRAM(s) Oral daily  clopidogrel Tablet 75 milliGRAM(s) Oral daily  enoxaparin Injectable 40 milliGRAM(s) SubCutaneous every 24 hours  escitalopram 10 milliGRAM(s) Oral daily  finasteride 5 milliGRAM(s) Oral daily  levothyroxine 100 MICROGram(s) Oral daily  multivitamin 1 Tablet(s) Oral daily  pantoprazole    Tablet 40 milliGRAM(s) Oral before breakfast  senna 1 Tablet(s) Oral daily  tamsulosin 0.4 milliGRAM(s) Oral at bedtime    MEDICATIONS  (PRN):  acetaminophen     Tablet .. 650 milliGRAM(s) Oral every 6 hours PRN Temp greater or equal to 38C (100.4F), Mild Pain (1 - 3)  aluminum hydroxide/magnesium hydroxide/simethicone Suspension 30 milliLiter(s) Oral every 4 hours PRN Dyspepsia  guaiFENesin Oral Liquid (Sugar-Free) 200 milliGRAM(s) Oral every 6 hours PRN Cough  melatonin 3 milliGRAM(s) Oral at bedtime PRN Insomnia  ondansetron Injectable 4 milliGRAM(s) IV Push every 8 hours PRN Nausea and/or Vomiting

## 2025-07-03 LAB
PYRIDOXAL PHOS SERPL-MCNC: 8.2 UG/L — SIGNIFICANT CHANGE UP (ref 3.4–65.2)
VIT B1 SERPL-MCNC: 150.8 NMOL/L — SIGNIFICANT CHANGE UP (ref 66.5–200)

## 2025-07-08 DIAGNOSIS — R47.81 SLURRED SPEECH: ICD-10-CM

## 2025-07-08 DIAGNOSIS — E78.5 HYPERLIPIDEMIA, UNSPECIFIED: ICD-10-CM

## 2025-07-08 DIAGNOSIS — E03.9 HYPOTHYROIDISM, UNSPECIFIED: ICD-10-CM

## 2025-07-08 DIAGNOSIS — N40.0 BENIGN PROSTATIC HYPERPLASIA WITHOUT LOWER URINARY TRACT SYMPTOMS: ICD-10-CM

## 2025-07-08 DIAGNOSIS — R47.01 APHASIA: ICD-10-CM

## 2025-07-08 DIAGNOSIS — Z79.899 OTHER LONG TERM (CURRENT) DRUG THERAPY: ICD-10-CM

## 2025-07-08 DIAGNOSIS — I10 ESSENTIAL (PRIMARY) HYPERTENSION: ICD-10-CM

## 2025-07-08 DIAGNOSIS — G81.93 HEMIPLEGIA, UNSPECIFIED AFFECTING RIGHT NONDOMINANT SIDE: ICD-10-CM

## 2025-07-08 DIAGNOSIS — Z79.890 HORMONE REPLACEMENT THERAPY: ICD-10-CM

## 2025-07-08 DIAGNOSIS — R47.1 DYSARTHRIA AND ANARTHRIA: ICD-10-CM

## 2025-07-08 DIAGNOSIS — I63.19 CEREBRAL INFARCTION DUE TO EMBOLISM OF OTHER PRECEREBRAL ARTERY: ICD-10-CM

## 2025-07-09 ENCOUNTER — APPOINTMENT (OUTPATIENT)
Dept: OPHTHALMOLOGY | Facility: CLINIC | Age: 85
End: 2025-07-09

## 2025-07-17 PROBLEM — E78.5 HYPERLIPIDEMIA, UNSPECIFIED: Chronic | Status: ACTIVE | Noted: 2025-06-26

## 2025-07-17 PROBLEM — N40.0 BENIGN PROSTATIC HYPERPLASIA WITHOUT LOWER URINARY TRACT SYMPTOMS: Chronic | Status: ACTIVE | Noted: 2025-06-26

## 2025-07-17 PROBLEM — E03.9 HYPOTHYROIDISM, UNSPECIFIED: Chronic | Status: ACTIVE | Noted: 2025-06-26

## 2025-07-17 PROBLEM — I10 ESSENTIAL (PRIMARY) HYPERTENSION: Chronic | Status: ACTIVE | Noted: 2025-06-26

## 2025-07-29 ENCOUNTER — APPOINTMENT (OUTPATIENT)
Dept: PULMONOLOGY | Facility: CLINIC | Age: 85
End: 2025-07-29
Payer: MEDICARE

## 2025-07-29 VITALS
DIASTOLIC BLOOD PRESSURE: 64 MMHG | WEIGHT: 155 LBS | HEART RATE: 73 BPM | BODY MASS INDEX: 23.49 KG/M2 | SYSTOLIC BLOOD PRESSURE: 115 MMHG | HEIGHT: 68 IN

## 2025-07-29 PROCEDURE — 94060 EVALUATION OF WHEEZING: CPT

## 2025-07-29 PROCEDURE — 94726 PLETHYSMOGRAPHY LUNG VOLUMES: CPT

## 2025-07-29 PROCEDURE — 94729 DIFFUSING CAPACITY: CPT

## 2025-08-04 ENCOUNTER — APPOINTMENT (OUTPATIENT)
Dept: VASCULAR SURGERY | Facility: CLINIC | Age: 85
End: 2025-08-04
Payer: MEDICARE

## 2025-08-04 VITALS
DIASTOLIC BLOOD PRESSURE: 62 MMHG | BODY MASS INDEX: 23.49 KG/M2 | WEIGHT: 155 LBS | SYSTOLIC BLOOD PRESSURE: 121 MMHG | TEMPERATURE: 97.7 F | HEIGHT: 68 IN | OXYGEN SATURATION: 95 % | HEART RATE: 65 BPM

## 2025-08-04 PROCEDURE — 99213 OFFICE O/P EST LOW 20 MIN: CPT

## 2025-08-04 PROCEDURE — 93925 LOWER EXTREMITY STUDY: CPT

## 2025-08-11 ENCOUNTER — APPOINTMENT (OUTPATIENT)
Dept: PULMONOLOGY | Facility: CLINIC | Age: 85
End: 2025-08-11
Payer: MEDICARE

## 2025-08-11 VITALS
OXYGEN SATURATION: 93 % | DIASTOLIC BLOOD PRESSURE: 68 MMHG | WEIGHT: 156 LBS | HEIGHT: 68 IN | SYSTOLIC BLOOD PRESSURE: 110 MMHG | BODY MASS INDEX: 23.64 KG/M2 | HEART RATE: 81 BPM

## 2025-08-11 DIAGNOSIS — R09.82 POSTNASAL DRIP: ICD-10-CM

## 2025-08-11 DIAGNOSIS — R06.02 SHORTNESS OF BREATH: ICD-10-CM

## 2025-08-11 DIAGNOSIS — R05.8 OTHER SPECIFIED COUGH: ICD-10-CM

## 2025-08-11 DIAGNOSIS — R93.89 ABNORMAL FINDINGS ON DIAGNOSTIC IMAGING OF OTHER SPECIFIED BODY STRUCTURES: ICD-10-CM

## 2025-08-11 DIAGNOSIS — R94.2 ABNORMAL RESULTS OF PULMONARY FUNCTION STUDIES: ICD-10-CM

## 2025-08-11 DIAGNOSIS — J98.4 OTHER DISORDERS OF LUNG: ICD-10-CM

## 2025-08-11 PROCEDURE — 99215 OFFICE O/P EST HI 40 MIN: CPT

## 2025-08-11 PROCEDURE — G2211 COMPLEX E/M VISIT ADD ON: CPT

## 2025-08-11 RX ORDER — ALBUTEROL SULFATE 90 UG/1
108 (90 BASE) INHALANT RESPIRATORY (INHALATION)
Qty: 1 | Refills: 5 | Status: ACTIVE | COMMUNITY
Start: 2025-08-11 | End: 1900-01-01

## 2025-08-11 RX ORDER — ELECTROLYTES/DEXTROSE
SOLUTION, ORAL ORAL DAILY
Refills: 0 | Status: ACTIVE | COMMUNITY
Start: 2025-08-11

## 2025-08-11 RX ORDER — CLOPIDOGREL BISULFATE 75 MG/1
75 TABLET, FILM COATED ORAL
Qty: 30 | Refills: 0 | Status: DISCONTINUED | COMMUNITY
Start: 2025-07-12 | End: 2025-08-11

## 2025-08-12 ENCOUNTER — RX RENEWAL (OUTPATIENT)
Age: 85
End: 2025-08-12

## 2025-08-12 RX ORDER — FLUTICASONE PROPIONATE 50 UG/1
50 SPRAY NASAL
Qty: 48 | Refills: 3 | Status: ACTIVE | COMMUNITY
Start: 2025-08-11 | End: 1900-01-01

## 2025-08-13 ENCOUNTER — APPOINTMENT (OUTPATIENT)
Dept: FAMILY MEDICINE | Facility: CLINIC | Age: 85
End: 2025-08-13
Payer: MEDICARE

## 2025-08-13 VITALS
HEART RATE: 61 BPM | BODY MASS INDEX: 23.64 KG/M2 | HEIGHT: 68 IN | RESPIRATION RATE: 16 BRPM | WEIGHT: 156 LBS | SYSTOLIC BLOOD PRESSURE: 128 MMHG | DIASTOLIC BLOOD PRESSURE: 69 MMHG | OXYGEN SATURATION: 94 %

## 2025-08-13 DIAGNOSIS — I63.9 CEREBRAL INFARCTION, UNSPECIFIED: ICD-10-CM

## 2025-08-13 DIAGNOSIS — S81.801A UNSPECIFIED OPEN WOUND, RIGHT LOWER LEG, INITIAL ENCOUNTER: ICD-10-CM

## 2025-08-13 DIAGNOSIS — Z09 ENCOUNTER FOR FOLLOW-UP EXAMINATION AFTER COMPLETED TREATMENT FOR CONDITIONS OTHER THAN MALIGNANT NEOPLASM: ICD-10-CM

## 2025-08-13 PROCEDURE — 99495 TRANSJ CARE MGMT MOD F2F 14D: CPT

## 2025-08-13 PROCEDURE — 99213 OFFICE O/P EST LOW 20 MIN: CPT

## 2025-08-13 RX ORDER — CEPHALEXIN 250 MG/1
250 CAPSULE ORAL
Qty: 10 | Refills: 0 | Status: ACTIVE | COMMUNITY
Start: 2025-08-13 | End: 1900-01-01

## 2025-08-13 RX ORDER — TAMSULOSIN HYDROCHLORIDE 0.4 MG/1
0.4 CAPSULE ORAL
Refills: 0 | Status: ACTIVE | COMMUNITY

## 2025-08-17 PROBLEM — I63.9 CVA (CEREBRAL VASCULAR ACCIDENT): Status: ACTIVE | Noted: 2025-08-13

## 2025-09-02 ENCOUNTER — APPOINTMENT (OUTPATIENT)
Dept: FAMILY MEDICINE | Facility: CLINIC | Age: 85
End: 2025-09-02

## 2025-09-08 ENCOUNTER — APPOINTMENT (OUTPATIENT)
Dept: VASCULAR SURGERY | Facility: CLINIC | Age: 85
End: 2025-09-08
Payer: MEDICARE

## 2025-09-08 VITALS
TEMPERATURE: 98 F | BODY MASS INDEX: 23.64 KG/M2 | WEIGHT: 156 LBS | SYSTOLIC BLOOD PRESSURE: 145 MMHG | DIASTOLIC BLOOD PRESSURE: 75 MMHG | OXYGEN SATURATION: 95 % | HEART RATE: 80 BPM | HEIGHT: 68 IN

## 2025-09-08 PROCEDURE — 99213 OFFICE O/P EST LOW 20 MIN: CPT

## 2025-09-08 PROCEDURE — 93880 EXTRACRANIAL BILAT STUDY: CPT

## 2025-09-19 ENCOUNTER — APPOINTMENT (OUTPATIENT)
Dept: FAMILY MEDICINE | Facility: CLINIC | Age: 85
End: 2025-09-19